# Patient Record
Sex: FEMALE | Race: WHITE | NOT HISPANIC OR LATINO | URBAN - METROPOLITAN AREA
[De-identification: names, ages, dates, MRNs, and addresses within clinical notes are randomized per-mention and may not be internally consistent; named-entity substitution may affect disease eponyms.]

---

## 2017-03-01 ENCOUNTER — OUTPATIENT (OUTPATIENT)
Dept: OUTPATIENT SERVICES | Facility: HOSPITAL | Age: 77
LOS: 1 days | End: 2017-03-01
Payer: COMMERCIAL

## 2017-03-01 ENCOUNTER — APPOINTMENT (OUTPATIENT)
Dept: CT IMAGING | Facility: CLINIC | Age: 77
End: 2017-03-01

## 2017-03-01 DIAGNOSIS — Z90.710 ACQUIRED ABSENCE OF BOTH CERVIX AND UTERUS: Chronic | ICD-10-CM

## 2017-03-01 DIAGNOSIS — H26.9 UNSPECIFIED CATARACT: Chronic | ICD-10-CM

## 2017-03-01 DIAGNOSIS — Z98.89 OTHER SPECIFIED POSTPROCEDURAL STATES: Chronic | ICD-10-CM

## 2017-03-01 DIAGNOSIS — R42 DIZZINESS AND GIDDINESS: ICD-10-CM

## 2017-03-01 PROCEDURE — 70450 CT HEAD/BRAIN W/O DYE: CPT

## 2017-03-01 PROCEDURE — 70450 CT HEAD/BRAIN W/O DYE: CPT | Mod: 26

## 2017-05-28 PROBLEM — M25.569 KNEE PAIN: Status: ACTIVE | Noted: 2017-05-28

## 2017-05-31 ENCOUNTER — APPOINTMENT (OUTPATIENT)
Dept: ORTHOPEDIC SURGERY | Facility: CLINIC | Age: 77
End: 2017-05-31

## 2017-05-31 VITALS — SYSTOLIC BLOOD PRESSURE: 139 MMHG | HEART RATE: 68 BPM | DIASTOLIC BLOOD PRESSURE: 82 MMHG | TEMPERATURE: 98.1 F

## 2017-05-31 DIAGNOSIS — Z87.39 PERSONAL HISTORY OF OTHER DISEASES OF THE MUSCULOSKELETAL SYSTEM AND CONNECTIVE TISSUE: ICD-10-CM

## 2017-05-31 DIAGNOSIS — M25.561 PAIN IN RIGHT KNEE: ICD-10-CM

## 2017-05-31 DIAGNOSIS — Z86.79 PERSONAL HISTORY OF OTHER DISEASES OF THE CIRCULATORY SYSTEM: ICD-10-CM

## 2017-05-31 DIAGNOSIS — M25.562 PAIN IN RIGHT KNEE: ICD-10-CM

## 2017-05-31 DIAGNOSIS — Z87.891 PERSONAL HISTORY OF NICOTINE DEPENDENCE: ICD-10-CM

## 2017-05-31 DIAGNOSIS — M25.569 PAIN IN UNSPECIFIED KNEE: ICD-10-CM

## 2017-05-31 DIAGNOSIS — Z78.9 OTHER SPECIFIED HEALTH STATUS: ICD-10-CM

## 2017-05-31 DIAGNOSIS — Z82.61 FAMILY HISTORY OF ARTHRITIS: ICD-10-CM

## 2017-05-31 RX ORDER — OMEPRAZOLE 10 MG/1
10 CAPSULE, DELAYED RELEASE ORAL
Refills: 0 | Status: ACTIVE | COMMUNITY

## 2017-05-31 RX ORDER — IRBESARTAN AND HYDROCHLOROTHIAZIDE 300; 12.5 MG/1; MG/1
TABLET, FILM COATED ORAL
Refills: 0 | Status: ACTIVE | COMMUNITY

## 2017-09-01 ENCOUNTER — OUTPATIENT (OUTPATIENT)
Dept: OUTPATIENT SERVICES | Facility: HOSPITAL | Age: 77
LOS: 1 days | End: 2017-09-01
Payer: COMMERCIAL

## 2017-09-01 ENCOUNTER — APPOINTMENT (OUTPATIENT)
Dept: MRI IMAGING | Facility: CLINIC | Age: 77
End: 2017-09-01
Payer: COMMERCIAL

## 2017-09-01 DIAGNOSIS — H26.9 UNSPECIFIED CATARACT: Chronic | ICD-10-CM

## 2017-09-01 DIAGNOSIS — Z90.710 ACQUIRED ABSENCE OF BOTH CERVIX AND UTERUS: Chronic | ICD-10-CM

## 2017-09-01 DIAGNOSIS — Z00.8 ENCOUNTER FOR OTHER GENERAL EXAMINATION: ICD-10-CM

## 2017-09-01 DIAGNOSIS — Z98.89 OTHER SPECIFIED POSTPROCEDURAL STATES: Chronic | ICD-10-CM

## 2017-09-01 PROCEDURE — 72148 MRI LUMBAR SPINE W/O DYE: CPT | Mod: 26

## 2017-09-01 PROCEDURE — 72148 MRI LUMBAR SPINE W/O DYE: CPT

## 2017-10-27 ENCOUNTER — EMERGENCY (EMERGENCY)
Facility: HOSPITAL | Age: 77
LOS: 1 days | Discharge: HOME | End: 2017-10-27
Attending: EMERGENCY MEDICINE | Admitting: EMERGENCY MEDICINE
Payer: COMMERCIAL

## 2017-10-27 VITALS
DIASTOLIC BLOOD PRESSURE: 71 MMHG | SYSTOLIC BLOOD PRESSURE: 109 MMHG | RESPIRATION RATE: 17 BRPM | OXYGEN SATURATION: 98 % | TEMPERATURE: 98 F | HEART RATE: 65 BPM

## 2017-10-27 VITALS
TEMPERATURE: 98 F | DIASTOLIC BLOOD PRESSURE: 87 MMHG | WEIGHT: 141.98 LBS | HEART RATE: 93 BPM | OXYGEN SATURATION: 97 % | HEIGHT: 65 IN | SYSTOLIC BLOOD PRESSURE: 153 MMHG | RESPIRATION RATE: 16 BRPM

## 2017-10-27 DIAGNOSIS — Z90.710 ACQUIRED ABSENCE OF BOTH CERVIX AND UTERUS: Chronic | ICD-10-CM

## 2017-10-27 DIAGNOSIS — Z98.89 OTHER SPECIFIED POSTPROCEDURAL STATES: Chronic | ICD-10-CM

## 2017-10-27 DIAGNOSIS — H26.9 UNSPECIFIED CATARACT: Chronic | ICD-10-CM

## 2017-10-27 PROCEDURE — 70160 X-RAY EXAM OF NASAL BONES: CPT | Mod: 26

## 2017-10-27 PROCEDURE — 70160 X-RAY EXAM OF NASAL BONES: CPT

## 2017-10-27 PROCEDURE — 73562 X-RAY EXAM OF KNEE 3: CPT

## 2017-10-27 PROCEDURE — 99284 EMERGENCY DEPT VISIT MOD MDM: CPT

## 2017-10-27 PROCEDURE — 99284 EMERGENCY DEPT VISIT MOD MDM: CPT | Mod: 25

## 2017-10-27 PROCEDURE — 73562 X-RAY EXAM OF KNEE 3: CPT | Mod: 26,50

## 2017-10-27 NOTE — ED PROVIDER NOTE - CARE PLAN
Principal Discharge DX:	Fall, initial encounter  Secondary Diagnosis:	Acute pain of both knees  Secondary Diagnosis:	Contusion of nose, initial encounter

## 2017-10-27 NOTE — ED PROVIDER NOTE - ENMT, MLM
Airway patent, Dried blood in left nare; no active bleeding. Mouth with normal mucosa. Throat has no vesicles, no oropharyngeal exudates and uvula is midline.

## 2017-10-27 NOTE — ED PROVIDER NOTE - FAMILY HISTORY
Mother  Still living? No  Family history of DVT, Age at diagnosis: Age Unknown     Father  Still living? No  Family history of arthritis, Age at diagnosis: Age Unknown

## 2017-10-27 NOTE — ED ADULT TRIAGE NOTE - CHIEF COMPLAINT QUOTE
States tripped and fell while at work and would like to have her knees checked because "they are artifical." Small laceration present to bridge of nose. Denies LOC. Denies syncope. Denies dizziness prior to fall. Denies blood thinners. Ambulatory into ED with steady gait. GCS 15. Alert and oriented x4. Denies neck pain. Denies numbness or tingling to peripheral extremities.

## 2017-10-27 NOTE — ED ADULT NURSE NOTE - OBJECTIVE STATEMENT
pt reports falling at work from standing height, landed on knees and did not brace fall, landed on face, denies LOC. Pt presents with bruised swollen nose with no epistaxis and bruised left knee. Pt reports epistaxis immediately after fall.

## 2017-10-27 NOTE — ED PROVIDER NOTE - OBJECTIVE STATEMENT
This patient is a 77 year old woman who presents to the ER c/o pain at her nose and requesting to be checked out s/p fall.  Patient states that she fell from standing.  No LOC.  She is concerned about her knee because she has hx of knee replacements.  Patient reports she had epistaxis after the fall that quickly resolved.

## 2017-10-27 NOTE — ED ADULT NURSE NOTE - PMH
Degenerative joint disease  Right Knee  GERD (gastroesophageal reflux disease)    Hypertension    Osteoarthritis

## 2017-11-22 ENCOUNTER — TRANSCRIPTION ENCOUNTER (OUTPATIENT)
Age: 77
End: 2017-11-22

## 2017-11-22 ENCOUNTER — EMERGENCY (EMERGENCY)
Facility: HOSPITAL | Age: 77
LOS: 1 days | Discharge: HOME | End: 2017-11-22
Attending: EMERGENCY MEDICINE
Payer: COMMERCIAL

## 2017-11-22 VITALS
SYSTOLIC BLOOD PRESSURE: 150 MMHG | DIASTOLIC BLOOD PRESSURE: 82 MMHG | TEMPERATURE: 98 F | HEART RATE: 87 BPM | RESPIRATION RATE: 18 BRPM | OXYGEN SATURATION: 99 %

## 2017-11-22 VITALS — HEIGHT: 65 IN | WEIGHT: 156.09 LBS

## 2017-11-22 DIAGNOSIS — H26.9 UNSPECIFIED CATARACT: Chronic | ICD-10-CM

## 2017-11-22 DIAGNOSIS — Z90.710 ACQUIRED ABSENCE OF BOTH CERVIX AND UTERUS: Chronic | ICD-10-CM

## 2017-11-22 DIAGNOSIS — Z98.89 OTHER SPECIFIED POSTPROCEDURAL STATES: Chronic | ICD-10-CM

## 2017-11-22 PROCEDURE — 73030 X-RAY EXAM OF SHOULDER: CPT

## 2017-11-22 PROCEDURE — 71101 X-RAY EXAM UNILAT RIBS/CHEST: CPT | Mod: 26

## 2017-11-22 PROCEDURE — 73030 X-RAY EXAM OF SHOULDER: CPT | Mod: 26,RT

## 2017-11-22 PROCEDURE — 90471 IMMUNIZATION ADMIN: CPT

## 2017-11-22 PROCEDURE — 70160 X-RAY EXAM OF NASAL BONES: CPT

## 2017-11-22 PROCEDURE — 71101 X-RAY EXAM UNILAT RIBS/CHEST: CPT

## 2017-11-22 PROCEDURE — 99284 EMERGENCY DEPT VISIT MOD MDM: CPT | Mod: 25

## 2017-11-22 PROCEDURE — 90715 TDAP VACCINE 7 YRS/> IM: CPT

## 2017-11-22 PROCEDURE — 70160 X-RAY EXAM OF NASAL BONES: CPT | Mod: 26

## 2017-11-22 PROCEDURE — 99284 EMERGENCY DEPT VISIT MOD MDM: CPT

## 2017-11-22 RX ORDER — TETANUS TOXOID, REDUCED DIPHTHERIA TOXOID AND ACELLULAR PERTUSSIS VACCINE, ADSORBED 5; 2.5; 8; 8; 2.5 [IU]/.5ML; [IU]/.5ML; UG/.5ML; UG/.5ML; UG/.5ML
0.5 SUSPENSION INTRAMUSCULAR ONCE
Qty: 0 | Refills: 0 | Status: COMPLETED | OUTPATIENT
Start: 2017-11-22 | End: 2017-11-22

## 2017-11-22 RX ORDER — IBUPROFEN 200 MG
1 TABLET ORAL
Qty: 15 | Refills: 0
Start: 2017-11-22 | End: 2017-11-27

## 2017-11-22 RX ADMIN — TETANUS TOXOID, REDUCED DIPHTHERIA TOXOID AND ACELLULAR PERTUSSIS VACCINE, ADSORBED 0.5 MILLILITER(S): 5; 2.5; 8; 8; 2.5 SUSPENSION INTRAMUSCULAR at 20:09

## 2017-11-22 NOTE — ED STATDOCS - ATTENDING CONTRIBUTION TO CARE
I, Audrey Yancey, performed the initial face to face bedside interview with this patient regarding history of present illness, review of symptoms and relevant past medical, social and family history.  I completed an independent physical examination.  I was the initial provider who evaluated this patient. I have signed out the follow up of any pending tests (i.e. labs, radiological studies) to the ACP.  I have communicated the patient’s plan of care and disposition with the ACP.  The history, relevant review of systems, past medical and surgical history, medical decision making, and physical examination was documented by the scribe in my presence and I attest to the accuracy of the documentation.

## 2017-11-22 NOTE — ED ADULT TRIAGE NOTE - CHIEF COMPLAINT QUOTE
s/p fall from standing height, denies blood thinner, denies LOC, abrasion to nose lip and lac above right eye

## 2017-11-22 NOTE — ED STATDOCS - PROGRESS NOTE DETAILS
Plastics called at 20:06 PA NOTE: Pt seen by intake physician and hpi/orders/plan reviewed. PT presenting to ED with complaints of fall forward walking up steps in home, pt denies LOC, head trama, anticoagulin use, HA dizziness, N/V, ABD pain, SOB, DIff breathing, weakens,, numbness, tingling, confusion.   PE: GEN: Awake, alert,  NAD,  EYES: PERRL CARDIAC: Reg rate and rhythm, S1,S2, RRR  RESP: No distress noted. Lungs CTA bilaterally no wheeze, ronchi, rales. ABD: soft,  non-tender, no guarding. . NEURO: AOx3, no focal deficits MS: YANNICK, Strenth intact, TTP DIff Rt shoulder, Rt ribs.    PLAN: add xrays and revaluate PA NOTE: PT seen resting comfortably in no acute distress, no acute complaints at this time, PT tolerating PO intake,  PT informed of all results, pt informed of possibility of change in radiology read after official read, lac closed by dr cat, PT will be DC home with supportive care, follow up to dr cat in 7 days,    educated about when to return to the ED if needed. PT verbalizes that he understands all instructions and results.

## 2017-11-22 NOTE — ED ADULT NURSE NOTE - OBJECTIVE STATEMENT
patient states that she was walking in the kitchen when she fell hitting her right eye and right rib. patient denies any LOC, or being on any blood thinners. patient had laceration repaired by plastic surgery in [facility]

## 2017-11-22 NOTE — ED STATDOCS - LACERATION LENGTH DESCRIPTION FT
3 cm relatively straight laceration through right eyebrow 3 cm s-shaped laceration through right eyebrow

## 2017-11-22 NOTE — ED STATDOCS - OBJECTIVE STATEMENT
77 year old female presenting to the ED complaining of a head injury and right rib pain s/p trip and fall. Pt states that she tripped while walking and fell from standing height. She states that she did not lose consciousness She states that she never has taken Plavix or Xarelto. Pt denies nausea. No further complaints at this time. 77 year old female presenting to the ED complaining of a head injury and right rib pain s/p trip and fall. Pt states that she tripped while walking and fell from standing height. She states that she did not lose consciousness She states that she never has taken Plavix or Xarelto. Pt denies nausea. She states that she is unsure as to when her last tetanus vaccination was given. No further complaints at this time. 77 year old female presenting to the ED complaining of a head injury and right rib pain s/p trip and fall. Pt states that she tripped while walking and fell from standing height. She states that she did not lose consciousness She states that she NEVER has taken Plavix, Xarelto, or any blood thinners. Pt denies nausea. She states that she is unsure as to when her last tetanus vaccination was given. No further complaints at this time.

## 2017-11-22 NOTE — ED STATDOCS - CARE PLAN
Principal Discharge DX:	Nasal bone fracture Principal Discharge DX:	Nasal bone fracture  Secondary Diagnosis:	Laceration of face

## 2017-11-29 ENCOUNTER — APPOINTMENT (OUTPATIENT)
Dept: ORTHOPEDIC SURGERY | Facility: CLINIC | Age: 77
End: 2017-11-29
Payer: COMMERCIAL

## 2017-11-29 VITALS
TEMPERATURE: 98 F | HEART RATE: 100 BPM | BODY MASS INDEX: 26.63 KG/M2 | SYSTOLIC BLOOD PRESSURE: 130 MMHG | DIASTOLIC BLOOD PRESSURE: 77 MMHG | HEIGHT: 64 IN | WEIGHT: 156 LBS

## 2017-11-29 PROCEDURE — 73562 X-RAY EXAM OF KNEE 3: CPT | Mod: 50

## 2017-11-29 PROCEDURE — 99213 OFFICE O/P EST LOW 20 MIN: CPT

## 2017-11-29 RX ORDER — IRBESARTAN AND HYDROCHLOROTHIAZIDE 150; 12.5 MG/1; MG/1
150-12.5 TABLET ORAL
Qty: 30 | Refills: 0 | Status: ACTIVE | COMMUNITY
Start: 2017-11-02

## 2017-11-29 RX ORDER — OMEPRAZOLE 20 MG/1
20 CAPSULE, DELAYED RELEASE ORAL
Qty: 90 | Refills: 0 | Status: ACTIVE | COMMUNITY
Start: 2017-09-07

## 2017-11-29 RX ORDER — METHYLPREDNISOLONE 4 MG/1
4 TABLET ORAL
Qty: 21 | Refills: 0 | Status: ACTIVE | COMMUNITY
Start: 2017-08-24

## 2017-11-29 RX ORDER — SODIUM SULFATE, POTASSIUM SULFATE, MAGNESIUM SULFATE 17.5; 3.13; 1.6 G/ML; G/ML; G/ML
17.5-3.13-1.6 SOLUTION, CONCENTRATE ORAL
Qty: 354 | Refills: 0 | Status: ACTIVE | COMMUNITY
Start: 2017-08-02

## 2017-11-29 RX ORDER — NAPROXEN 500 MG/1
500 TABLET ORAL
Qty: 60 | Refills: 0 | Status: ACTIVE | COMMUNITY
Start: 2017-11-03

## 2017-11-29 RX ORDER — IBUPROFEN 600 MG/1
600 TABLET, FILM COATED ORAL
Qty: 15 | Refills: 0 | Status: ACTIVE | COMMUNITY
Start: 2017-11-22

## 2018-04-04 ENCOUNTER — APPOINTMENT (OUTPATIENT)
Dept: ORTHOPEDIC SURGERY | Facility: CLINIC | Age: 78
End: 2018-04-04
Payer: COMMERCIAL

## 2018-04-04 VITALS
SYSTOLIC BLOOD PRESSURE: 134 MMHG | HEART RATE: 80 BPM | HEIGHT: 64 IN | BODY MASS INDEX: 26.46 KG/M2 | DIASTOLIC BLOOD PRESSURE: 77 MMHG | WEIGHT: 155 LBS | TEMPERATURE: 98.7 F

## 2018-04-04 PROCEDURE — 99213 OFFICE O/P EST LOW 20 MIN: CPT

## 2018-10-01 ENCOUNTER — OTHER (OUTPATIENT)
Age: 78
End: 2018-10-01

## 2019-02-14 NOTE — ED STATDOCS - ATTESTATION, MLM
10 I have reviewed and confirmed nurses' notes for patient's medications, allergies, medical history, and surgical history.

## 2019-03-23 ENCOUNTER — EMERGENCY (EMERGENCY)
Facility: HOSPITAL | Age: 79
LOS: 1 days | Discharge: HOME | End: 2019-03-23
Attending: EMERGENCY MEDICINE
Payer: COMMERCIAL

## 2019-03-23 VITALS
DIASTOLIC BLOOD PRESSURE: 51 MMHG | OXYGEN SATURATION: 99 % | HEIGHT: 65 IN | SYSTOLIC BLOOD PRESSURE: 154 MMHG | TEMPERATURE: 98 F | HEART RATE: 79 BPM | WEIGHT: 134.92 LBS | RESPIRATION RATE: 16 BRPM

## 2019-03-23 DIAGNOSIS — Z90.710 ACQUIRED ABSENCE OF BOTH CERVIX AND UTERUS: Chronic | ICD-10-CM

## 2019-03-23 DIAGNOSIS — H26.9 UNSPECIFIED CATARACT: Chronic | ICD-10-CM

## 2019-03-23 DIAGNOSIS — Z98.89 OTHER SPECIFIED POSTPROCEDURAL STATES: Chronic | ICD-10-CM

## 2019-03-23 PROCEDURE — 99284 EMERGENCY DEPT VISIT MOD MDM: CPT | Mod: 25

## 2019-03-23 PROCEDURE — 70450 CT HEAD/BRAIN W/O DYE: CPT | Mod: 26

## 2019-03-23 PROCEDURE — 90715 TDAP VACCINE 7 YRS/> IM: CPT

## 2019-03-23 PROCEDURE — 12013 RPR F/E/E/N/L/M 2.6-5.0 CM: CPT | Mod: XU

## 2019-03-23 PROCEDURE — 90471 IMMUNIZATION ADMIN: CPT

## 2019-03-23 PROCEDURE — 21310: CPT

## 2019-03-23 PROCEDURE — 12013 RPR F/E/E/N/L/M 2.6-5.0 CM: CPT | Mod: 59

## 2019-03-23 PROCEDURE — 70450 CT HEAD/BRAIN W/O DYE: CPT

## 2019-03-23 PROCEDURE — 70486 CT MAXILLOFACIAL W/O DYE: CPT

## 2019-03-23 PROCEDURE — 70486 CT MAXILLOFACIAL W/O DYE: CPT | Mod: 26

## 2019-03-23 RX ORDER — TETANUS TOXOID, REDUCED DIPHTHERIA TOXOID AND ACELLULAR PERTUSSIS VACCINE, ADSORBED 5; 2.5; 8; 8; 2.5 [IU]/.5ML; [IU]/.5ML; UG/.5ML; UG/.5ML; UG/.5ML
0.5 SUSPENSION INTRAMUSCULAR ONCE
Qty: 0 | Refills: 0 | Status: COMPLETED | OUTPATIENT
Start: 2019-03-23 | End: 2019-03-23

## 2019-03-23 RX ORDER — OXYCODONE HYDROCHLORIDE 5 MG/1
1 TABLET ORAL
Qty: 12 | Refills: 0 | OUTPATIENT
Start: 2019-03-23 | End: 2019-03-25

## 2019-03-23 RX ORDER — ACETAMINOPHEN 500 MG
650 TABLET ORAL ONCE
Qty: 0 | Refills: 0 | Status: COMPLETED | OUTPATIENT
Start: 2019-03-23 | End: 2019-03-23

## 2019-03-23 RX ORDER — OXYCODONE HYDROCHLORIDE 5 MG/1
1 TABLET ORAL
Qty: 12 | Refills: 0
Start: 2019-03-23 | End: 2019-03-25

## 2019-03-23 RX ADMIN — Medication 650 MILLIGRAM(S): at 16:54

## 2019-03-23 RX ADMIN — TETANUS TOXOID, REDUCED DIPHTHERIA TOXOID AND ACELLULAR PERTUSSIS VACCINE, ADSORBED 0.5 MILLILITER(S): 5; 2.5; 8; 8; 2.5 SUSPENSION INTRAMUSCULAR at 17:29

## 2019-03-23 RX ADMIN — Medication 650 MILLIGRAM(S): at 15:07

## 2019-03-23 NOTE — ED ADULT TRIAGE NOTE - CHIEF COMPLAINT QUOTE
patient was walking at a parade and she caught her boot on a raised sidewalk. patient reports falling and hitting her nose, knocking some of her top teeth out, scraped her chin and hurt her left knee. patient is not on anticoags. denies loc.

## 2019-03-23 NOTE — ED STATDOCS - ENMT, MLM
Dried blood in b/l nares, no active bleeding. Abrasion to nose, chin and mouth. 0.5cm laceration inner mucosa of lower lip. Avulsed Throat has no vesicles, no oropharyngeal exudates and uvula is midline.

## 2019-03-23 NOTE — ED STATDOCS - PROGRESS NOTE DETAILS
Results noted and findings d/w patient - + Nasal bone fx/ Orbital wall fx. Pt also with small laceration under chin (2 small punctate lacs) , 1cm linear lac under lower lip and small 0.5 cm lac inner lower lip. Will suture/ Dermabond. Case d/w Dr Oglesby who reviewed CT scans and is recommending pain control, no nose blowing and out-pt f/u. Pt will see in Office on Tuesday.

## 2019-03-23 NOTE — ED STATDOCS - SKIN, MLM
skin normal color for race, warm, dry. Ecchymosis over inner aspect of left knee and over right patella

## 2019-03-23 NOTE — ED STATDOCS - CARE PLAN
Principal Discharge DX:	Facial bone fracture  Secondary Diagnosis:	Orbital fracture  Secondary Diagnosis:	Laceration of face

## 2019-03-23 NOTE — ED STATDOCS - NS_ ATTENDINGSCRIBEDETAILS _ED_A_ED_FT
I, Alejandra Mackenzie, performed the initial face to face bedside interview with this patient regarding history of present illness, review of symptoms and relevant past medical, social and family history.  I completed an independent physical examination.  I was the provider who initially evaluated this patient.  The history, relevant review of systems, past medical and surgical history, medical decision making, and physical examination was documented by the scribe in my presence and I attest to the accuracy of the documentation. Follow-up on ordered tests (ie labs, radiologic studies) and re-evaluation of the patient's status has been communicated to the ACP.  Disposition of the patient will be based on test outcome and response to ED interventions.

## 2019-03-23 NOTE — ED STATDOCS - CLINICAL SUMMARY MEDICAL DECISION MAKING FREE TEXT BOX
78 y.o F presents s/p fall with abrasions to chin and nose, laceration to upper frontal gingiva. Plan for x-ray to r/o facial bone fracture, treat for pain. Ice pack applied to minimize swelling

## 2019-04-05 ENCOUNTER — APPOINTMENT (OUTPATIENT)
Dept: ORTHOPEDIC SURGERY | Facility: CLINIC | Age: 79
End: 2019-04-05
Payer: COMMERCIAL

## 2019-04-05 VITALS
TEMPERATURE: 98.7 F | HEIGHT: 64 IN | BODY MASS INDEX: 28.17 KG/M2 | SYSTOLIC BLOOD PRESSURE: 141 MMHG | DIASTOLIC BLOOD PRESSURE: 73 MMHG | HEART RATE: 72 BPM | WEIGHT: 165 LBS

## 2019-04-05 DIAGNOSIS — Z96.653 AFTERCARE FOLLOWING JOINT REPLACEMENT SURGERY: ICD-10-CM

## 2019-04-05 DIAGNOSIS — Z47.1 AFTERCARE FOLLOWING JOINT REPLACEMENT SURGERY: ICD-10-CM

## 2019-04-05 PROCEDURE — 73562 X-RAY EXAM OF KNEE 3: CPT | Mod: 50

## 2019-04-05 PROCEDURE — 99213 OFFICE O/P EST LOW 20 MIN: CPT

## 2019-04-05 NOTE — PHYSICAL EXAM
[Normal] : Gait: normal [LE] : Sensory: Intact in bilateral lower extremities [DP] : dorsalis pedis 2+ and symmetric bilaterally [PT] : posterior tibial 2+ and symmetric bilaterally [Obese] : obese [Poor Appearance] : well-appearing [Acute Distress] : not in acute distress [de-identified] : General appearance: Well nourished, well developed, pleasant, alert, and oriented x3.\par Respiratory: Breathing not labored, in no acute distress.\par HEENT: Normocephalic. EOM intact. Sclerae are clear.\par CV: No apparent abnormalities. No lower leg edema. No varicosities. Pedal pulses are palpable.\par Neurologic: Sensation is intact to light touch in the upper and lower extremities. No muscle weakness.\par Dermatologic: No apparent skin lesions or rash.\par Spine: C spine and L spine appear normal and move freely, normal and nontender.\par Upper Extremities: Hands, wrists, and elbows are normal and move freely. Shoulders are normal and move freely. All range of motion is symmetrical.\par Normal body habitus. Pulses are palpable.\par Review of systems, please see form for complete details. Medical data sheet was reviewed.\par \par Left knee, FROM hip, midline incision well healed, no effusion, no lag,  0 - 130, no crepitus, no medial pain, no lateral pain, no anterior drawer, no posterior drawer, stable, anatomic alignment \par Right knee, FROM hip, midline incision well healed,  no effusion, 0 - 120, no crepitus, no medial pain, no lateral pain, no anterior drawer, no posterior drawer, stable, anatomic alignment \par   [de-identified] : bilateral Knee xrays, taken at the office today:\par Standing AP, Lateral, and Merchant films show:\par Total knee replacement hardware in neutral alignment, without evidence of loosening, well centered patella, no evidence of fracture

## 2019-04-05 NOTE — ADDENDUM
[FreeTextEntry1] : I, Stephan Moon, acted solely as a scribe for Dr. Mg Mckenzie on 04/05/2019.\par \par All medical record entries made by the scribe were at my, Dr. Mg Mckenzie, direction and personally dictated by me on 04/05/2019. I have reviewed the chart and agree that the record accurately reflects my personal performance of the history, physical exam, assessment and plan. I have also personally directed, reviewed, and agreed with the chart.

## 2019-04-05 NOTE — HISTORY OF PRESENT ILLNESS
[de-identified] : Patient is a 78 year old female who presents for follow up of bilateral TKA. patient fell and landed on left knee \par Left knee - 2014. anterior pain and bruising s/p fall positive clicking, no locking, buckling or swelling \par Right knee 2015, no pain, no clicking, locking, buckling or swelling

## 2019-04-05 NOTE — REASON FOR VISIT
[Follow-Up Visit] : a follow-up visit for [Artificial Knee Joint] : artificial knee joint [Knee Pain] : knee pain [FreeTextEntry2] : Left knee pain

## 2019-06-03 ENCOUNTER — APPOINTMENT (OUTPATIENT)
Dept: ORTHOPEDIC SURGERY | Facility: CLINIC | Age: 79
End: 2019-06-03

## 2020-09-04 ENCOUNTER — OUTPATIENT (OUTPATIENT)
Dept: OUTPATIENT SERVICES | Facility: HOSPITAL | Age: 80
LOS: 1 days | End: 2020-09-04
Payer: MEDICARE

## 2020-09-04 VITALS
RESPIRATION RATE: 16 BRPM | HEIGHT: 65 IN | OXYGEN SATURATION: 96 % | TEMPERATURE: 98 F | DIASTOLIC BLOOD PRESSURE: 60 MMHG | HEART RATE: 60 BPM | WEIGHT: 154.1 LBS | SYSTOLIC BLOOD PRESSURE: 150 MMHG

## 2020-09-04 DIAGNOSIS — Z96.653 PRESENCE OF ARTIFICIAL KNEE JOINT, BILATERAL: Chronic | ICD-10-CM

## 2020-09-04 DIAGNOSIS — Z01.818 ENCOUNTER FOR OTHER PREPROCEDURAL EXAMINATION: ICD-10-CM

## 2020-09-04 DIAGNOSIS — I10 ESSENTIAL (PRIMARY) HYPERTENSION: ICD-10-CM

## 2020-09-04 DIAGNOSIS — Z98.89 OTHER SPECIFIED POSTPROCEDURAL STATES: Chronic | ICD-10-CM

## 2020-09-04 DIAGNOSIS — J01.00 ACUTE MAXILLARY SINUSITIS, UNSPECIFIED: ICD-10-CM

## 2020-09-04 DIAGNOSIS — Z90.710 ACQUIRED ABSENCE OF BOTH CERVIX AND UTERUS: Chronic | ICD-10-CM

## 2020-09-04 DIAGNOSIS — J32.0 CHRONIC MAXILLARY SINUSITIS: ICD-10-CM

## 2020-09-04 DIAGNOSIS — H26.9 UNSPECIFIED CATARACT: Chronic | ICD-10-CM

## 2020-09-04 LAB
ANION GAP SERPL CALC-SCNC: 4 MMOL/L — LOW (ref 5–17)
APTT BLD: 30.2 SEC — SIGNIFICANT CHANGE UP (ref 27.5–35.5)
BUN SERPL-MCNC: 31 MG/DL — HIGH (ref 7–23)
CALCIUM SERPL-MCNC: 8.8 MG/DL — SIGNIFICANT CHANGE UP (ref 8.5–10.1)
CHLORIDE SERPL-SCNC: 110 MMOL/L — HIGH (ref 96–108)
CO2 SERPL-SCNC: 27 MMOL/L — SIGNIFICANT CHANGE UP (ref 22–31)
CREAT SERPL-MCNC: 1.4 MG/DL — HIGH (ref 0.5–1.3)
GLUCOSE SERPL-MCNC: 89 MG/DL — SIGNIFICANT CHANGE UP (ref 70–99)
HCT VFR BLD CALC: 36.2 % — SIGNIFICANT CHANGE UP (ref 34.5–45)
HGB BLD-MCNC: 11.5 G/DL — SIGNIFICANT CHANGE UP (ref 11.5–15.5)
INR BLD: 1.04 RATIO — SIGNIFICANT CHANGE UP (ref 0.88–1.16)
MCHC RBC-ENTMCNC: 27.9 PG — SIGNIFICANT CHANGE UP (ref 27–34)
MCHC RBC-ENTMCNC: 31.8 GM/DL — LOW (ref 32–36)
MCV RBC AUTO: 87.9 FL — SIGNIFICANT CHANGE UP (ref 80–100)
NRBC # BLD: 0 /100 WBCS — SIGNIFICANT CHANGE UP (ref 0–0)
PLATELET # BLD AUTO: 232 K/UL — SIGNIFICANT CHANGE UP (ref 150–400)
POTASSIUM SERPL-MCNC: 4.6 MMOL/L — SIGNIFICANT CHANGE UP (ref 3.5–5.3)
POTASSIUM SERPL-SCNC: 4.6 MMOL/L — SIGNIFICANT CHANGE UP (ref 3.5–5.3)
PROTHROM AB SERPL-ACNC: 12.1 SEC — SIGNIFICANT CHANGE UP (ref 10.6–13.6)
RBC # BLD: 4.12 M/UL — SIGNIFICANT CHANGE UP (ref 3.8–5.2)
RBC # FLD: 13 % — SIGNIFICANT CHANGE UP (ref 10.3–14.5)
SODIUM SERPL-SCNC: 141 MMOL/L — SIGNIFICANT CHANGE UP (ref 135–145)
WBC # BLD: 5.83 K/UL — SIGNIFICANT CHANGE UP (ref 3.8–10.5)
WBC # FLD AUTO: 5.83 K/UL — SIGNIFICANT CHANGE UP (ref 3.8–10.5)

## 2020-09-04 PROCEDURE — G0463: CPT

## 2020-09-04 PROCEDURE — 85610 PROTHROMBIN TIME: CPT

## 2020-09-04 PROCEDURE — 93010 ELECTROCARDIOGRAM REPORT: CPT

## 2020-09-04 PROCEDURE — 36415 COLL VENOUS BLD VENIPUNCTURE: CPT

## 2020-09-04 PROCEDURE — 85730 THROMBOPLASTIN TIME PARTIAL: CPT

## 2020-09-04 PROCEDURE — 93005 ELECTROCARDIOGRAM TRACING: CPT

## 2020-09-04 PROCEDURE — 85027 COMPLETE CBC AUTOMATED: CPT

## 2020-09-04 PROCEDURE — 80048 BASIC METABOLIC PNL TOTAL CA: CPT

## 2020-09-04 NOTE — H&P PST ADULT - NSICDXPROBLEM_GEN_ALL_CORE_FT
PROBLEM DIAGNOSES  Problem: Acute maxillary sinusitis, unspecified  Assessment and Plan: Endoscopic sinus surgery- Right maxillary oroantral fistula closure  Labs- CBC, BMP, PT/PTT, INR  Pre op instructions discussed    Problem: Benign hypertension  Assessment and Plan: continue with meds

## 2020-09-04 NOTE — H&P PST ADULT - NSICDXPASTMEDICALHX_GEN_ALL_CORE_FT
PAST MEDICAL HISTORY:  Degenerative joint disease Right Knee    GERD (gastroesophageal reflux disease)     Hypertension     Hyperthyroidism 2017    Osteoarthritis

## 2020-09-04 NOTE — H&P PST ADULT - NSICDXPASTSURGICALHX_GEN_ALL_CORE_FT
PAST SURGICAL HISTORY:  Cataract bilat    H/O total knee replacement, bilateral Left 2014, Right 2015    History of needle biopsy B/L Breasts    S/P hysterectomy with oophorectomy 1988

## 2020-09-04 NOTE — H&P PST ADULT - NSICDXFAMILYHX_GEN_ALL_CORE_FT
FAMILY HISTORY:  Father  Still living? No  Family history of arthritis, Age at diagnosis: Age Unknown    Mother  Still living? No  Family history of DVT, Age at diagnosis: Age Unknown

## 2020-09-04 NOTE — H&P PST ADULT - HISTORY OF PRESENT ILLNESS
This 80 yo female with h/o HTN, HLD, GERD, OA, hyperthyroidsm recurrent sinusitis, c/o non healing right upper gum since dental extraction since 8/2019. Pt had ENT consult- s/p CT sinuses revealed acute maxillary sinusitis, oroantral fistula- scheduled for endoscopic sinus surgery- right maxillary oroantral fistula closure on 09/16/20 . Pt denies any fever, chills or s/s of infection    **Covid 19 PCR to be scheduled This 80 yo female with h/o HTN, HLD, GERD, OA, hyperthyroidsm, recurrent sinusitis, c/o non healing right upper gum s/p dental extraction since 8/2019. Pt had ENT consult- s/p CT sinuses revealed acute maxillary sinusitis, oroantral fistula- scheduled for endoscopic sinus surgery- right maxillary oroantral fistula closure on 09/16/20 . Pt denies any fever, chills or s/s of infection    **Covid 19 PCR to be scheduled

## 2020-09-04 NOTE — H&P PST ADULT - NSANTHOSAYNRD_GEN_A_CORE
No. BALDO screening performed.  STOP BANG Legend: 0-2 = LOW Risk; 3-4 = INTERMEDIATE Risk; 5-8 = HIGH Risk

## 2020-09-10 PROBLEM — E05.90 THYROTOXICOSIS, UNSPECIFIED WITHOUT THYROTOXIC CRISIS OR STORM: Chronic | Status: ACTIVE | Noted: 2020-09-04

## 2020-09-14 ENCOUNTER — OUTPATIENT (OUTPATIENT)
Dept: OUTPATIENT SERVICES | Facility: HOSPITAL | Age: 80
LOS: 1 days | End: 2020-09-14
Payer: MEDICARE

## 2020-09-14 DIAGNOSIS — Z98.89 OTHER SPECIFIED POSTPROCEDURAL STATES: Chronic | ICD-10-CM

## 2020-09-14 DIAGNOSIS — Z11.59 ENCOUNTER FOR SCREENING FOR OTHER VIRAL DISEASES: ICD-10-CM

## 2020-09-14 DIAGNOSIS — Z96.653 PRESENCE OF ARTIFICIAL KNEE JOINT, BILATERAL: Chronic | ICD-10-CM

## 2020-09-14 DIAGNOSIS — H26.9 UNSPECIFIED CATARACT: Chronic | ICD-10-CM

## 2020-09-14 DIAGNOSIS — Z90.710 ACQUIRED ABSENCE OF BOTH CERVIX AND UTERUS: Chronic | ICD-10-CM

## 2020-09-14 LAB — SARS-COV-2 RNA SPEC QL NAA+PROBE: SIGNIFICANT CHANGE UP

## 2020-09-14 PROCEDURE — U0003: CPT

## 2020-09-15 ENCOUNTER — TRANSCRIPTION ENCOUNTER (OUTPATIENT)
Age: 80
End: 2020-09-15

## 2020-09-15 NOTE — ASU PATIENT PROFILE, ADULT - LEARNING ASSESSMENT (PATIENT) ADDITIONAL COMMENTS
Tips for safer suregery and pre op instructions given.  Pt demonstrates understanding of instructions.

## 2020-09-16 ENCOUNTER — OUTPATIENT (OUTPATIENT)
Dept: OUTPATIENT SERVICES | Facility: HOSPITAL | Age: 80
LOS: 1 days | End: 2020-09-16
Payer: MEDICARE

## 2020-09-16 ENCOUNTER — RESULT REVIEW (OUTPATIENT)
Age: 80
End: 2020-09-16

## 2020-09-16 VITALS
DIASTOLIC BLOOD PRESSURE: 58 MMHG | RESPIRATION RATE: 16 BRPM | SYSTOLIC BLOOD PRESSURE: 132 MMHG | OXYGEN SATURATION: 97 % | TEMPERATURE: 98 F | HEART RATE: 77 BPM | WEIGHT: 158.95 LBS | HEIGHT: 65 IN

## 2020-09-16 VITALS
OXYGEN SATURATION: 99 % | HEART RATE: 68 BPM | SYSTOLIC BLOOD PRESSURE: 138 MMHG | DIASTOLIC BLOOD PRESSURE: 60 MMHG | RESPIRATION RATE: 12 BRPM

## 2020-09-16 DIAGNOSIS — Z96.653 PRESENCE OF ARTIFICIAL KNEE JOINT, BILATERAL: Chronic | ICD-10-CM

## 2020-09-16 DIAGNOSIS — H26.9 UNSPECIFIED CATARACT: Chronic | ICD-10-CM

## 2020-09-16 DIAGNOSIS — J32.0 CHRONIC MAXILLARY SINUSITIS: ICD-10-CM

## 2020-09-16 DIAGNOSIS — Z01.818 ENCOUNTER FOR OTHER PREPROCEDURAL EXAMINATION: ICD-10-CM

## 2020-09-16 DIAGNOSIS — J01.00 ACUTE MAXILLARY SINUSITIS, UNSPECIFIED: ICD-10-CM

## 2020-09-16 DIAGNOSIS — J32.9 CHRONIC SINUSITIS, UNSPECIFIED: ICD-10-CM

## 2020-09-16 DIAGNOSIS — Z98.89 OTHER SPECIFIED POSTPROCEDURAL STATES: Chronic | ICD-10-CM

## 2020-09-16 DIAGNOSIS — Z90.710 ACQUIRED ABSENCE OF BOTH CERVIX AND UTERUS: Chronic | ICD-10-CM

## 2020-09-16 PROCEDURE — 31267 ENDOSCOPY MAXILLARY SINUS: CPT | Mod: RT

## 2020-09-16 PROCEDURE — 88304 TISSUE EXAM BY PATHOLOGIST: CPT | Mod: 26

## 2020-09-16 PROCEDURE — 88311 DECALCIFY TISSUE: CPT | Mod: 26

## 2020-09-16 PROCEDURE — 88311 DECALCIFY TISSUE: CPT

## 2020-09-16 PROCEDURE — 30580 REPAIR UPPER JAW FISTULA: CPT

## 2020-09-16 PROCEDURE — 88305 TISSUE EXAM BY PATHOLOGIST: CPT | Mod: 26

## 2020-09-16 PROCEDURE — 88305 TISSUE EXAM BY PATHOLOGIST: CPT

## 2020-09-16 PROCEDURE — 88304 TISSUE EXAM BY PATHOLOGIST: CPT

## 2020-09-16 RX ORDER — OXYCODONE HYDROCHLORIDE 5 MG/1
5 TABLET ORAL ONCE
Refills: 0 | Status: DISCONTINUED | OUTPATIENT
Start: 2020-09-16 | End: 2020-09-16

## 2020-09-16 RX ORDER — CEPHALEXIN 500 MG
1 CAPSULE ORAL
Qty: 20 | Refills: 0
Start: 2020-09-16 | End: 2020-09-25

## 2020-09-16 RX ORDER — ONDANSETRON 8 MG/1
4 TABLET, FILM COATED ORAL ONCE
Refills: 0 | Status: DISCONTINUED | OUTPATIENT
Start: 2020-09-16 | End: 2020-09-16

## 2020-09-16 RX ORDER — HYDROMORPHONE HYDROCHLORIDE 2 MG/ML
0.5 INJECTION INTRAMUSCULAR; INTRAVENOUS; SUBCUTANEOUS
Refills: 0 | Status: DISCONTINUED | OUTPATIENT
Start: 2020-09-16 | End: 2020-09-16

## 2020-09-16 RX ORDER — SODIUM CHLORIDE 9 MG/ML
1000 INJECTION, SOLUTION INTRAVENOUS
Refills: 0 | Status: DISCONTINUED | OUTPATIENT
Start: 2020-09-16 | End: 2020-09-16

## 2020-09-16 RX ADMIN — SODIUM CHLORIDE 75 MILLILITER(S): 9 INJECTION, SOLUTION INTRAVENOUS at 09:41

## 2020-09-16 NOTE — BRIEF OPERATIVE NOTE - NSICDXBRIEFPREOP_GEN_ALL_CORE_FT
PRE-OP DIAGNOSIS:  Chronic maxillary sinusitis 16-Sep-2020 09:31:05  Ruperto Gonzalez  Maxillary sinusitis, chronic 16-Sep-2020 09:30:47  Ruperto Gonzalez

## 2020-09-16 NOTE — BRIEF OPERATIVE NOTE - NSICDXBRIEFPROCEDURE_GEN_ALL_CORE_FT
PROCEDURES:  Functional endoscopic sinus surgery (FESS) with antrostomy of maxillary sinus 16-Sep-2020 09:33:05  Ruperto Gonzalez

## 2020-09-16 NOTE — ASU DISCHARGE PLAN (ADULT/PEDIATRIC) - CARE PROVIDER_API CALL
Ruperto Gonzalez  OTOLARYNGOLOGY  [address] [place] [id number]  Phone: [phone]  Fax: [phone]  Follow Up Time:

## 2020-09-17 LAB — SURGICAL PATHOLOGY STUDY: SIGNIFICANT CHANGE UP

## 2021-01-01 ENCOUNTER — APPOINTMENT (OUTPATIENT)
Dept: ORTHOPEDIC SURGERY | Facility: CLINIC | Age: 81
End: 2021-01-01
Payer: MEDICARE

## 2021-01-01 VITALS
WEIGHT: 164 LBS | SYSTOLIC BLOOD PRESSURE: 193 MMHG | HEART RATE: 66 BPM | HEIGHT: 65 IN | TEMPERATURE: 98.1 F | DIASTOLIC BLOOD PRESSURE: 76 MMHG | BODY MASS INDEX: 27.32 KG/M2

## 2021-01-01 PROCEDURE — 99215 OFFICE O/P EST HI 40 MIN: CPT

## 2021-01-01 PROCEDURE — 73562 X-RAY EXAM OF KNEE 3: CPT | Mod: 50

## 2021-01-01 RX ORDER — MELOXICAM 15 MG/1
15 TABLET ORAL
Qty: 30 | Refills: 0 | Status: ACTIVE | COMMUNITY
Start: 2021-01-01 | End: 1900-01-01

## 2021-03-26 NOTE — ED ADULT TRIAGE NOTE - LOCATION:
Patient Education        Asthma Attack in Children: Care Instructions  Overview     During an asthma attack, the airways swell and narrow. This makes it hard for your child to breathe. Severe asthma attacks can be dangerous. But you can help prevent these attacks by keeping your child's asthma under control and treating symptoms before they get bad. Symptoms include being short of breath, having chest tightness, coughing, and wheezing. Noting and treating these symptoms can also help you avoid trips to the emergency room. If you notice that your child has any problems or new symptoms, get medical treatment right away. Follow-up care is a key part of your child's treatment and safety. Be sure to make and go to all appointments, and call your doctor if your child is having problems. It's also a good idea to know your child's test results and keep a list of the medicines your child takes. How can you care for your child at home? Follow an action plan  · Make and follow an asthma action plan. It lists the medicines your child takes every day and will show you what to do if your child has an attack. · Work with a doctor to make a plan if your child doesn't have one. Make treatment part of daily life. · Tell teachers and coaches that your child has asthma. Give them a copy of your child's asthma action plan. Take medications correctly  · Your child should take asthma medicines as directed. Talk to your child's doctor right away if you have any questions about how your child should take them. Most children with asthma need two types of medicine. ? Your child may take daily controller medicine to control asthma. This is usually an inhaled steroid. Don't use the daily medicine to treat an attack that has already started. It doesn't work fast enough. ? Your child will use a quick-relief medicine when he or she has symptoms of an attack. This is usually an albuterol inhaler.   ? Make sure that your child has quick-relief medicine with him or her at all times. ? If your doctor prescribed steroid pills for your child to use during an attack, give them exactly as prescribed. It may take hours for the pills to work. But they may make the episode shorter and help your child breathe better. Check your child's breathing  · If your child has a peak flow meter, use it to check how well your child is breathing. This can help you predict when an asthma attack is going to occur. Then your child can take medicine to prevent the asthma attack or make it less severe. Most children age 11 and older can learn how to use this meter. Avoid asthma triggers  · Keep your child away from smoke. Do not smoke or let anyone else smoke around your child or in your house. · Try to learn what triggers your child's asthma attacks. Then avoid the triggers when you can. Common triggers include colds, smoke, air pollution, pollen, mold, pets, cockroaches, stress, and cold air. · Make sure your child is up to date on immunizations and gets a yearly flu vaccine. When should you call for help? Call 911 anytime you think your child may need emergency care. For example, call if:    · Your child has severe trouble breathing. Call your doctor now or seek immediate medical care if:    · Your child's symptoms do not get better after you've followed the asthma action plan.     · Your child has new or worse trouble breathing.     · Your child's coughing or wheezing gets worse.     · Your child coughs up dark brown or bloody mucus (sputum).     · Your child has a new or higher fever.    Watch closely for changes in your child's health, and be sure to contact your doctor if:    · Your child needs quick-relief medicine on more than 2 days a week within a month (unless it is just for exercise).     · Your child coughs more deeply or more often, especially if you notice more mucus or a change in the color of the mucus.     · Your child is not getting better as expected. Where can you learn more? Go to [email or web address].[email or web address] org and sign in to your [employer] account. Enter B822 in the [employer] box to learn more about \"Asthma Attack in Children: Care Instructions. \"     If you do not have an account, please click on the \"Sign Up Now\" link. Current as of: October 26, 2020               Content Version: 12.8  © 2006-2021 Healthwise[employer] Incorporated. Care instructions adapted under license by [facility] ([facility]). If you have questions about a medical condition or this instruction, always ask your healthcare professional. [address] any warranty or liability for your use of this information. Right arm;

## 2021-07-05 ENCOUNTER — TRANSCRIPTION ENCOUNTER (OUTPATIENT)
Age: 81
End: 2021-07-05

## 2021-07-06 ENCOUNTER — EMERGENCY (EMERGENCY)
Facility: HOSPITAL | Age: 81
LOS: 1 days | Discharge: HOME | End: 2021-07-06
Attending: EMERGENCY MEDICINE
Payer: MEDICARE

## 2021-07-06 VITALS
WEIGHT: 143.3 LBS | TEMPERATURE: 98 F | HEART RATE: 90 BPM | OXYGEN SATURATION: 99 % | RESPIRATION RATE: 18 BRPM | SYSTOLIC BLOOD PRESSURE: 154 MMHG | DIASTOLIC BLOOD PRESSURE: 72 MMHG | HEIGHT: 65 IN

## 2021-07-06 DIAGNOSIS — H26.9 UNSPECIFIED CATARACT: Chronic | ICD-10-CM

## 2021-07-06 DIAGNOSIS — Z96.653 PRESENCE OF ARTIFICIAL KNEE JOINT, BILATERAL: Chronic | ICD-10-CM

## 2021-07-06 DIAGNOSIS — Z90.710 ACQUIRED ABSENCE OF BOTH CERVIX AND UTERUS: Chronic | ICD-10-CM

## 2021-07-06 DIAGNOSIS — Z98.89 OTHER SPECIFIED POSTPROCEDURAL STATES: Chronic | ICD-10-CM

## 2021-07-06 LAB
ALBUMIN SERPL ELPH-MCNC: 4.1 G/DL — SIGNIFICANT CHANGE UP (ref 3.3–5.2)
ALP SERPL-CCNC: 123 U/L — HIGH (ref 40–120)
ALT FLD-CCNC: 18 U/L — SIGNIFICANT CHANGE UP
ANION GAP SERPL CALC-SCNC: 12 MMOL/L — SIGNIFICANT CHANGE UP (ref 5–17)
AST SERPL-CCNC: 23 U/L — SIGNIFICANT CHANGE UP
BILIRUB SERPL-MCNC: 0.2 MG/DL — LOW (ref 0.4–2)
BUN SERPL-MCNC: 30 MG/DL — HIGH (ref 8–20)
CALCIUM SERPL-MCNC: 9.5 MG/DL — SIGNIFICANT CHANGE UP (ref 8.6–10.2)
CHLORIDE SERPL-SCNC: 106 MMOL/L — SIGNIFICANT CHANGE UP (ref 98–107)
CO2 SERPL-SCNC: 23 MMOL/L — SIGNIFICANT CHANGE UP (ref 22–29)
CREAT SERPL-MCNC: 1.36 MG/DL — HIGH (ref 0.5–1.3)
GLUCOSE SERPL-MCNC: 139 MG/DL — HIGH (ref 70–99)
HCT VFR BLD CALC: 35.9 % — SIGNIFICANT CHANGE UP (ref 34.5–45)
HGB BLD-MCNC: 11.4 G/DL — LOW (ref 11.5–15.5)
MCHC RBC-ENTMCNC: 27.8 PG — SIGNIFICANT CHANGE UP (ref 27–34)
MCHC RBC-ENTMCNC: 31.8 GM/DL — LOW (ref 32–36)
MCV RBC AUTO: 87.6 FL — SIGNIFICANT CHANGE UP (ref 80–100)
PLATELET # BLD AUTO: 215 K/UL — SIGNIFICANT CHANGE UP (ref 150–400)
POTASSIUM SERPL-MCNC: 4.9 MMOL/L — SIGNIFICANT CHANGE UP (ref 3.5–5.3)
POTASSIUM SERPL-SCNC: 4.9 MMOL/L — SIGNIFICANT CHANGE UP (ref 3.5–5.3)
PROT SERPL-MCNC: 7 G/DL — SIGNIFICANT CHANGE UP (ref 6.6–8.7)
RBC # BLD: 4.1 M/UL — SIGNIFICANT CHANGE UP (ref 3.8–5.2)
RBC # FLD: 13.2 % — SIGNIFICANT CHANGE UP (ref 10.3–14.5)
SODIUM SERPL-SCNC: 141 MMOL/L — SIGNIFICANT CHANGE UP (ref 135–145)
WBC # BLD: 10.85 K/UL — HIGH (ref 3.8–10.5)
WBC # FLD AUTO: 10.85 K/UL — HIGH (ref 3.8–10.5)

## 2021-07-06 PROCEDURE — 93010 ELECTROCARDIOGRAM REPORT: CPT

## 2021-07-06 PROCEDURE — G1004: CPT

## 2021-07-06 PROCEDURE — 70450 CT HEAD/BRAIN W/O DYE: CPT | Mod: 26,MH

## 2021-07-06 PROCEDURE — 73030 X-RAY EXAM OF SHOULDER: CPT | Mod: 26,LT

## 2021-07-06 PROCEDURE — 99284 EMERGENCY DEPT VISIT MOD MDM: CPT

## 2021-07-06 PROCEDURE — 70486 CT MAXILLOFACIAL W/O DYE: CPT | Mod: 26,MG

## 2021-07-06 PROCEDURE — 72125 CT NECK SPINE W/O DYE: CPT | Mod: 26,MH

## 2021-07-06 RX ORDER — ACETAMINOPHEN 500 MG
650 TABLET ORAL ONCE
Refills: 0 | Status: COMPLETED | OUTPATIENT
Start: 2021-07-06 | End: 2021-07-06

## 2021-07-06 RX ORDER — ONDANSETRON 8 MG/1
4 TABLET, FILM COATED ORAL ONCE
Refills: 0 | Status: COMPLETED | OUTPATIENT
Start: 2021-07-06 | End: 2021-07-06

## 2021-07-06 RX ADMIN — Medication 650 MILLIGRAM(S): at 21:01

## 2021-07-06 RX ADMIN — ONDANSETRON 4 MILLIGRAM(S): 8 TABLET, FILM COATED ORAL at 21:43

## 2021-07-06 NOTE — ED PROVIDER NOTE - PROGRESS NOTE DETAILS
Jose Raul AMANDA: sutured and nose packed by Dr Gorman, Recommending augmentin, will return in the morning to remove packing. Will place patient in CDU.

## 2021-07-06 NOTE — ED PROVIDER NOTE - CLINICAL SUMMARY MEDICAL DECISION MAKING FREE TEXT BOX
Pt s/p fall, possible LOC. plan for EKG, labs, CT head, neck, max face, x-ray shoulder and lac repair

## 2021-07-06 NOTE — ED PROVIDER NOTE - PMH
Degenerative joint disease  Right Knee  GERD (gastroesophageal reflux disease)    Hypertension    Hyperthyroidism  2017  Osteoarthritis

## 2021-07-06 NOTE — ED PROVIDER NOTE - PHYSICAL EXAMINATION
Gen: Well appearing in NAD  Head: Large laceration left forehead, well approximated. T Shaped laceration above bridge of nose. TTP bridge of nose   Neck: trachea midline  EENT: Dried blood in both nares, no active bleeding. Dried blood in mouth, no active bleeding  Eyes: EOMI  Resp:  No distress  Ext: no deformities  MSK: No hip tenderness. No L/C/S spine tenderness. TTP Left shoulder, full ROM.   Neuro:  A&O appears non focal  Skin:  Warm and dry as visualized  Psych:  Normal affect and mood

## 2021-07-06 NOTE — ED PROVIDER NOTE - OBJECTIVE STATEMENT
81y/o F with PMHx of HTN, Acid Reflux, Thyroid disease presents to the ED s/p fall, witnessed by  at bedside with head trauma c/o HA, nose and shoulder pain.  denies LOC but patient states she does not recall what happened. Denies CP, Nausea, vomiting, SOB or vision changes. No use of blood thinners. Last TDAP 1 year ago.

## 2021-07-06 NOTE — ED PROVIDER NOTE - PSH
Cataract  bilat  H/O total knee replacement, bilateral  Left 2014, Right 2015  History of needle biopsy  B/L Breasts  S/P hysterectomy with oophorectomy  1988

## 2021-07-06 NOTE — ED ADULT NURSE NOTE - OBJECTIVE STATEMENT
PT A7Ox4.  Pt stated that she was at home and she was walking in her kitchen when she tripped and fell.  PT denies LOC however does not remember if she hit anything.  PT received in c-collar, noted to have a bloody nose and lacerations to forehead.  Bleeding controlled. Will continue to monitor.

## 2021-07-06 NOTE — ED ADULT TRIAGE NOTE - CHIEF COMPLAINT QUOTE
BIBEMS s/p unwitnessed fall from standing height with unknown LOC. Lacerations noted to head and c-collar applied for EMS. GCS 15 in triage. (-) anticoagulation medication. Pt denies blurry vision, double vision, dizziness, lightheadedness. Pts only complaint is mild HA at this time.

## 2021-07-06 NOTE — ED PROVIDER NOTE - CARE PLAN
Principal Discharge DX:	Facial laceration, initial encounter  Secondary Diagnosis:	Nasal bone fracture  Secondary Diagnosis:	Epistaxis

## 2021-07-07 VITALS
SYSTOLIC BLOOD PRESSURE: 122 MMHG | DIASTOLIC BLOOD PRESSURE: 66 MMHG | HEART RATE: 74 BPM | OXYGEN SATURATION: 99 % | TEMPERATURE: 98 F | RESPIRATION RATE: 18 BRPM

## 2021-07-07 PROCEDURE — 70486 CT MAXILLOFACIAL W/O DYE: CPT

## 2021-07-07 PROCEDURE — 21315 CLSD TX NSL FX MNPJ WO STBLJ: CPT

## 2021-07-07 PROCEDURE — 99285 EMERGENCY DEPT VISIT HI MDM: CPT | Mod: 25

## 2021-07-07 PROCEDURE — 70450 CT HEAD/BRAIN W/O DYE: CPT

## 2021-07-07 PROCEDURE — 14041 TIS TRNFR F/C/C/M/N/A/G/H/F: CPT

## 2021-07-07 PROCEDURE — 85027 COMPLETE CBC AUTOMATED: CPT

## 2021-07-07 PROCEDURE — 73030 X-RAY EXAM OF SHOULDER: CPT

## 2021-07-07 PROCEDURE — 93005 ELECTROCARDIOGRAM TRACING: CPT

## 2021-07-07 PROCEDURE — 80053 COMPREHEN METABOLIC PANEL: CPT

## 2021-07-07 PROCEDURE — 72125 CT NECK SPINE W/O DYE: CPT

## 2021-07-07 PROCEDURE — 12052 INTMD RPR FACE/MM 2.6-5.0 CM: CPT | Mod: XU

## 2021-07-07 PROCEDURE — G0378: CPT

## 2021-07-07 PROCEDURE — 13121 CMPLX RPR S/A/L 2.6-7.5 CM: CPT | Mod: XU

## 2021-07-07 PROCEDURE — 36415 COLL VENOUS BLD VENIPUNCTURE: CPT

## 2021-07-07 PROCEDURE — 13122 CMPLX RPR S/A/L ADDL 5 CM/>: CPT | Mod: XU

## 2021-07-07 PROCEDURE — 96374 THER/PROPH/DIAG INJ IV PUSH: CPT | Mod: XU

## 2021-07-07 PROCEDURE — 99220: CPT

## 2021-07-07 RX ORDER — METOPROLOL TARTRATE 50 MG
25 TABLET ORAL DAILY
Refills: 0 | Status: DISCONTINUED | OUTPATIENT
Start: 2021-07-07 | End: 2021-07-11

## 2021-07-07 RX ORDER — LOSARTAN POTASSIUM 100 MG/1
50 TABLET, FILM COATED ORAL DAILY
Refills: 0 | Status: DISCONTINUED | OUTPATIENT
Start: 2021-07-07 | End: 2021-07-11

## 2021-07-07 RX ORDER — HYDROCHLOROTHIAZIDE 25 MG
12.5 TABLET ORAL DAILY
Refills: 0 | Status: DISCONTINUED | OUTPATIENT
Start: 2021-07-07 | End: 2021-07-11

## 2021-07-07 RX ADMIN — LOSARTAN POTASSIUM 50 MILLIGRAM(S): 100 TABLET, FILM COATED ORAL at 06:28

## 2021-07-07 RX ADMIN — Medication 25 MILLIGRAM(S): at 01:48

## 2021-07-07 RX ADMIN — Medication 12.5 MILLIGRAM(S): at 01:48

## 2021-07-07 RX ADMIN — Medication 1 TABLET(S): at 01:48

## 2021-07-07 NOTE — ED CDU PROVIDER DISPOSITION NOTE - CARE PROVIDER_API CALL
Freddie Gorman)  Plastic Surgery  [address] [place] [address]  Phone: [phone]  Fax: [phone]  Follow Up Time:     Jose Carlos Rivera)  Otolaryngology  [address] [place] [address]  Phone: [phone]  Fax: [phone]  Follow Up Time:

## 2021-07-07 NOTE — CONSULT NOTE ADULT - CONSULT REASON
Dr. Cornelius, please advise on the mother's concerns.   
From: Jenelle Cervantes  To: Shyanne Arreola DO  Sent: 7/15/2019 3:06 PM CDT  Subject: Non-Urgent Medical Question    This message is being sent by Johanna Blunt on behalf of Jenelle Cornelius,    Ronni, Jenelle, and I are going camping this weekend and I had noticed that it'll be 95° on Friday, 90° on Saturday and 86° on Sunday. We will be camping in a tent and Ronni was worried that it will be too hot for her to go because prolonged exposure to heat is bad for babies. Is that true and should I keep her home? Or are there things I can do to make it safe for her to go? Thank you!    Johanna Blunt  
responded  
forehead face lacerations and nasal fracture

## 2021-07-07 NOTE — ED CDU PROVIDER INITIAL DAY NOTE - ATTENDING CONTRIBUTION TO CARE
Jose Raul: I performed a face to face bedside interview with patient regarding history of present illness, review of symptoms and past medical history. I completed an independent physical exam.  I have discussed patient's plan of care with advanced care provider.   I agree with note as stated above including HISTORY OF PRESENT ILLNESS, HIV, PAST MEDICAL/SURGICAL/FAMILY/SOCIAL HISTORY, ALLERGIES AND HOME MEDICATIONS, REVIEW OF SYSTEMS, PHYSICAL EXAM, MEDICAL DECISION MAKING and any PROGRESS NOTES during the time I functioned as the attending physician for this patient  unless otherwise noted. My brief assessment is as follows: Patient s/p fall with scalp lac, nasal bone fx and mild epistaxis. Repaired by plastics, placed in CDU for abx and for packing rmoval in the AM.

## 2021-07-07 NOTE — ED CDU PROVIDER INITIAL DAY NOTE - OBJECTIVE STATEMENT
79y/o F with PMHx of HTN, Acid Reflux, Thyroid disease presents to the ED s/p fall, witnessed by  at bedside with head trauma c/o HA, nose and shoulder pain.  denies LOC but patient states she does not recall what happened. Denies CP, Nausea, vomiting, SOB or vision changes. No use of blood thinners. Last TDAP 1 year ago.

## 2021-07-07 NOTE — ED CDU PROVIDER DISPOSITION NOTE - ATTENDING CONTRIBUTION TO CARE
Jose Raul: I performed a face to face bedside interview with patient regarding history of present illness, review of symptoms and past medical history. I completed an independent physical exam.  I have discussed patient's plan of care with advanced care provider.   I agree with note as stated above including HISTORY OF PRESENT ILLNESS, HIV, PAST MEDICAL/SURGICAL/FAMILY/SOCIAL HISTORY, ALLERGIES AND HOME MEDICATIONS, REVIEW OF SYSTEMS, PHYSICAL EXAM, MEDICAL DECISION MAKING and any PROGRESS NOTES during the time I functioned as the attending physician for this patient  unless otherwise noted. My brief assessment is as follows: Patient with scalp laceration and nasal fracture. Repaired by Dr Gorman, placed in CDU with nasal packing. Nasal packing removed by Dr Gorman in the morning. Ready for dc on abx.

## 2021-07-07 NOTE — ED CDU PROVIDER DISPOSITION NOTE - CLINICAL COURSE
pt is a 81 y/o female presenting to the ed after fall, pt had ct preformed - nasal fxs noted, dr cat plastics sutured laceration nasal packing placed - in obervation dr cat removed packing antibitocs follow up referral with dr cat/ent

## 2021-07-07 NOTE — ED CDU PROVIDER DISPOSITION NOTE - PATIENT PORTAL LINK FT
You can access the FollowMyHealth Patient Portal offered by [facility] by registering at the following website: [email or web address]. By joining [facility]’s FollowMyHealth portal, you will also be able to view your health information using other applications (apps) compatible with our system.

## 2021-09-15 DIAGNOSIS — Z01.818 ENCOUNTER FOR OTHER PREPROCEDURAL EXAMINATION: ICD-10-CM

## 2021-09-17 ENCOUNTER — APPOINTMENT (OUTPATIENT)
Dept: DISASTER EMERGENCY | Facility: CLINIC | Age: 81
End: 2021-09-17

## 2021-09-18 LAB — SARS-COV-2 N GENE NPH QL NAA+PROBE: NOT DETECTED

## 2021-09-20 ENCOUNTER — TRANSCRIPTION ENCOUNTER (OUTPATIENT)
Age: 81
End: 2021-09-20

## 2021-09-20 ENCOUNTER — OUTPATIENT (OUTPATIENT)
Dept: OUTPATIENT SERVICES | Facility: HOSPITAL | Age: 81
LOS: 1 days | End: 2021-09-20
Payer: MEDICARE

## 2021-09-20 VITALS
TEMPERATURE: 98 F | OXYGEN SATURATION: 100 % | HEART RATE: 66 BPM | DIASTOLIC BLOOD PRESSURE: 66 MMHG | RESPIRATION RATE: 18 BRPM | SYSTOLIC BLOOD PRESSURE: 152 MMHG

## 2021-09-20 VITALS — SYSTOLIC BLOOD PRESSURE: 152 MMHG | OXYGEN SATURATION: 99 % | DIASTOLIC BLOOD PRESSURE: 66 MMHG | HEART RATE: 66 BPM

## 2021-09-20 DIAGNOSIS — H26.9 UNSPECIFIED CATARACT: Chronic | ICD-10-CM

## 2021-09-20 DIAGNOSIS — I34.0 NONRHEUMATIC MITRAL (VALVE) INSUFFICIENCY: ICD-10-CM

## 2021-09-20 DIAGNOSIS — Z90.710 ACQUIRED ABSENCE OF BOTH CERVIX AND UTERUS: Chronic | ICD-10-CM

## 2021-09-20 DIAGNOSIS — Z96.653 PRESENCE OF ARTIFICIAL KNEE JOINT, BILATERAL: Chronic | ICD-10-CM

## 2021-09-20 DIAGNOSIS — Z98.89 OTHER SPECIFIED POSTPROCEDURAL STATES: Chronic | ICD-10-CM

## 2021-09-20 LAB
ANION GAP SERPL CALC-SCNC: 13 MMOL/L — SIGNIFICANT CHANGE UP (ref 5–17)
BUN SERPL-MCNC: 26.3 MG/DL — HIGH (ref 8–20)
CALCIUM SERPL-MCNC: 10.1 MG/DL — SIGNIFICANT CHANGE UP (ref 8.6–10.2)
CHLORIDE SERPL-SCNC: 102 MMOL/L — SIGNIFICANT CHANGE UP (ref 98–107)
CO2 SERPL-SCNC: 24 MMOL/L — SIGNIFICANT CHANGE UP (ref 22–29)
CREAT SERPL-MCNC: 1.61 MG/DL — HIGH (ref 0.5–1.3)
GLUCOSE SERPL-MCNC: 99 MG/DL — SIGNIFICANT CHANGE UP (ref 70–99)
HCT VFR BLD CALC: 36.7 % — SIGNIFICANT CHANGE UP (ref 34.5–45)
HGB BLD-MCNC: 11.8 G/DL — SIGNIFICANT CHANGE UP (ref 11.5–15.5)
MAGNESIUM SERPL-MCNC: 1.7 MG/DL — SIGNIFICANT CHANGE UP (ref 1.6–2.6)
MCHC RBC-ENTMCNC: 28 PG — SIGNIFICANT CHANGE UP (ref 27–34)
MCHC RBC-ENTMCNC: 32.2 GM/DL — SIGNIFICANT CHANGE UP (ref 32–36)
MCV RBC AUTO: 87 FL — SIGNIFICANT CHANGE UP (ref 80–100)
PLATELET # BLD AUTO: 253 K/UL — SIGNIFICANT CHANGE UP (ref 150–400)
POTASSIUM SERPL-MCNC: 4.5 MMOL/L — SIGNIFICANT CHANGE UP (ref 3.5–5.3)
POTASSIUM SERPL-SCNC: 4.5 MMOL/L — SIGNIFICANT CHANGE UP (ref 3.5–5.3)
RBC # BLD: 4.22 M/UL — SIGNIFICANT CHANGE UP (ref 3.8–5.2)
RBC # FLD: 13 % — SIGNIFICANT CHANGE UP (ref 10.3–14.5)
SODIUM SERPL-SCNC: 139 MMOL/L — SIGNIFICANT CHANGE UP (ref 135–145)
WBC # BLD: 5.38 K/UL — SIGNIFICANT CHANGE UP (ref 3.8–10.5)
WBC # FLD AUTO: 5.38 K/UL — SIGNIFICANT CHANGE UP (ref 3.8–10.5)

## 2021-09-20 PROCEDURE — 80048 BASIC METABOLIC PNL TOTAL CA: CPT

## 2021-09-20 PROCEDURE — 93005 ELECTROCARDIOGRAM TRACING: CPT

## 2021-09-20 PROCEDURE — 93325 DOPPLER ECHO COLOR FLOW MAPG: CPT

## 2021-09-20 PROCEDURE — 85027 COMPLETE CBC AUTOMATED: CPT

## 2021-09-20 PROCEDURE — 93312 ECHO TRANSESOPHAGEAL: CPT

## 2021-09-20 PROCEDURE — 83735 ASSAY OF MAGNESIUM: CPT

## 2021-09-20 PROCEDURE — 93010 ELECTROCARDIOGRAM REPORT: CPT

## 2021-09-20 PROCEDURE — 93320 DOPPLER ECHO COMPLETE: CPT

## 2021-09-20 PROCEDURE — 36415 COLL VENOUS BLD VENIPUNCTURE: CPT

## 2021-09-20 RX ORDER — CALCIUM CARBONATE 500(1250)
1 TABLET ORAL
Qty: 0 | Refills: 0 | DISCHARGE

## 2021-09-20 RX ORDER — IRBESARTAN AND HYDROCHLOROTHIAZIDE 12.5; 3 MG/1; MG/1
1 TABLET ORAL
Qty: 0 | Refills: 0 | DISCHARGE

## 2021-09-20 RX ORDER — IRBESARTAN 75 MG/1
1 TABLET ORAL
Qty: 30 | Refills: 0
Start: 2021-09-20 | End: 2021-10-19

## 2021-09-20 RX ORDER — ACETAMINOPHEN 500 MG
2 TABLET ORAL
Qty: 0 | Refills: 0 | DISCHARGE

## 2021-09-20 RX ORDER — METOPROLOL TARTRATE 50 MG
1 TABLET ORAL
Qty: 0 | Refills: 0 | DISCHARGE

## 2021-09-20 NOTE — DISCHARGE NOTE PROVIDER - NSDCCPTREATMENT_GEN_ALL_CORE_FT
PRINCIPAL PROCEDURE  Procedure: Transesophageal echocardiogram (JOE)  Findings and Treatment: Do not drive or operate machinery today as you have received sedation. You should take it easy today and take your time, especially when changing positions. Follow up with  *** to further discuss the results of the OJE and any further evaluations going forward.       PRINCIPAL PROCEDURE  Procedure: Transesophageal echocardiogram (JOE)  Findings and Treatment: Do not drive or operate machinery today as you have received sedation. You should take it easy today and take your time, especially when changing positions. Follow up with Dr. Larose to further discuss the results of the JOE and any further evaluations going forward.

## 2021-09-20 NOTE — DISCHARGE NOTE NURSING/CASE MANAGEMENT/SOCIAL WORK - PATIENT PORTAL LINK FT
You can access the FollowMyHealth Patient Portal offered by [facility] by registering at the following website: [email or web address]. By joining [employer]’s FollowMyHealth portal, you will also be able to view your health information using other applications (apps) compatible with our system.

## 2021-09-20 NOTE — PROGRESS NOTE ADULT - SUBJECTIVE AND OBJECTIVE BOX
[facility], [facility]                                   [address] [place] [address]                                                      PHONE: [phone]                                                         FAX: [phone]  -----------------------------------------------------------------------------------------------------------    I have seen and examined this patient. H & P reviewed. Informed consent obtained.   Risks and benefits fully explained. All questions answered.

## 2021-09-20 NOTE — DISCHARGE NOTE NURSING/CASE MANAGEMENT/SOCIAL WORK - NSDCVIVACCINE_GEN_ALL_CORE_FT
Tdap; 22-Nov-2017 20:09; Adriana Shine (RN); [employer]; [id number]; IntraMuscular; Deltoid Right.; 0.5 milliLiter(s); VIS (VIS Published: 09-May-2013, VIS Presented: 22-Nov-2017);   Tdap; 23-Mar-2019 17:29; Shayla Cooper); [employer]; [id number] (Exp. Date: 26-Feb-2021); IntraMuscular; Deltoid Left.; 0.5 milliLiter(s); VIS (VIS Published: 09-May-2013, VIS Presented: 23-Mar-2019);

## 2021-09-20 NOTE — H&P PST ADULT - HISTORY OF PRESENT ILLNESS
80 year old female who initially p/w syncope and collapse.  Work up with echo revealed severe MR.  For JOE to assess mitral valve    8/9/21 TTE:  EF 70-75%  Trace AVR  Severe MVR  Mod TR

## 2021-09-20 NOTE — DISCHARGE NOTE PROVIDER - NSDCFUADDINST_GEN_ALL_CORE_FT
Do not drive or operate machinery today as you have received sedation. You should take it easy today and take your time, especially when changing positions. Follow up with  *** to further discuss the results of the JOE and any further evaluations going forward.

## 2021-09-20 NOTE — H&P PST ADULT - NSICDXPASTMEDICALHX_GEN_ALL_CORE_FT
PAST MEDICAL HISTORY:  Degenerative joint disease Right Knee    GERD (gastroesophageal reflux disease)     Hypertension     Hyperthyroidism 2017    Osteoarthritis     Severe mitral valve regurgitation

## 2021-09-20 NOTE — DISCHARGE NOTE PROVIDER - CARE PROVIDER_API CALL
RACHEL WHEELER  Cardiology  [address] [place] [address].  [place] [address]  Phone: [phone]  Fax: [phone]  Follow Up Time:

## 2021-09-20 NOTE — PROGRESS NOTE ADULT - SUBJECTIVE AND OBJECTIVE BOX
[facility]                                           [facility], [facility]                                   [address] [address]                                                      PHONE: [phone]                                                         FAX: [phone]  --------------------------------------------------------------------------------------------------------    JOE performed    LVEF 60%, moderate MR    Full report to follow    Rec:  Medical therapy for now  Results discussed with pt in detail  Outpt FU with Dr. Larose in 2 weeks                                                     [facility]                                           [facility], [facility]                                   [address] [address]                                                      PHONE: [phone]                                                         FAX: [phone]  --------------------------------------------------------------------------------------------------------    JOE performed    LVEF 60%, moderate MR    Full report to follow    Rec:  Medical therapy for now  Hold HCTZ and change to Avapro only for 2 weeks  Results discussed with pt in detail  Outpt FU with Dr. Larose in 2 weeks

## 2021-09-20 NOTE — DISCHARGE NOTE PROVIDER - HOSPITAL COURSE
80 year old female with h/o HTN, hyperthyroid, OA and DJD, recent syncopal episode thought initially to be related to dehydration, TTE revealed severe MR and presented today for JOE.    Now s/p JOE without gargle, tolerated procedure well, transferred to recovery HDS and in NAD, verbal report from Dr. Huber with mod MR and preserved LVEF. Discussed elevated Cr with Dr. Huber and will D/C HCTZ.    D/C home with plan to F/U with primary cardiologist Dr. Connor. Dr Huber will discuss findings and plan with Dr. Larose.

## 2021-09-20 NOTE — DISCHARGE NOTE PROVIDER - NSDCMRMEDTOKEN_GEN_ALL_CORE_FT
irbesartan 150 mg oral tablet: 1 tab(s) orally once a day   methIMAzole: 2.5  mg orally  metoprolol succinate 25 mg oral tablet, extended release: 1 tab(s) orally once a day  omeprazole 20 mg oral delayed release capsule: 1 cap(s) orally once a day

## 2021-09-20 NOTE — DISCHARGE NOTE PROVIDER - NSDCCPCAREPLAN_GEN_ALL_CORE_FT
PRINCIPAL DISCHARGE DIAGNOSIS  Diagnosis: MR (mitral regurgitation)  Assessment and Plan of Treatment: The JOE revealed moderate MR. At this time, this is managed with medications and close follow up.  Take your medications as prescribed. Monitor for symptoms of heart failure: increasing shortness of breath, inability to lie flat, swelling in your legs, increased abdomiinal girth or bloating, any significant increase in weight, increasing dizziness or fatigue,      SECONDARY DISCHARGE DIAGNOSES  Diagnosis: HTN (hypertension)  Assessment and Plan of Treatment: Continue to take antihypertensive medications as prescribed. Obtain a home blood pressure monitor (at any pharmacy or medical supply store) and monitor blood pressure trends. Call your doctor if you note you blood pressure to be running much higher or lower than usual. Limit salt intake.

## 2021-09-24 NOTE — ASU PATIENT PROFILE, ADULT - PROVIDER NOTIFICATION
Sidney comes in after 3 weeks of trying his binaural Phonak hearing aids.  Overall he has been doing quite well with the hearing aids.  His wife notices that he is hearing much better.  He has the television volume turned from the 40s down to the upper teens which is much more comfortable for his wife.  He does have some complaints about high-frequency sounds such as water running and potato chip bags.  He is counseled that this is normal and he does admit that it seems better now than it did when he 1st started using the hearing aids 3 weeks ago.  We will leave the settings programmed as is for now.  His other complaint is that road noise in driving in the car seems to be intolerable a loud.  I did reprogrammed increasing the noise block in his speech and speech in noise programs to hopefully alleviate that, however again he was counseled that is normal to hear the car noise but hopefully it will not interfere with communication in the car.  He will try the hearing aids at the settings and return in 3 weeks at the end of his trial.  His other question was that he had received some information from his insurance that they may be starting a hearing aid benefit in the beginning of 2022 so he will check into this while he still has the opportunity to return these hearing aids if needed.   Declines

## 2021-12-01 PROBLEM — I34.0 NONRHEUMATIC MITRAL (VALVE) INSUFFICIENCY: Chronic | Status: ACTIVE | Noted: 2021-09-20

## 2021-12-08 NOTE — CONSULT NOTE ADULT - SUBJECTIVE AND OBJECTIVE BOX
pt sp fall does not remember how or what she hit  she has forehead avulsion, nasal laceration nasal  fracture and  epistaxis    pt repaired at bedside  packing placed with oxymetazoline (Afrin)    ice packs to face and nose  packing to be removed in 5 hrs  elevation  antibiotics   None

## 2021-12-09 NOTE — REASON FOR VISIT
[Initial Visit] : an initial visit for [Artificial Knee Joint] : artificial knee joint [FreeTextEntry2] : Left knee pain

## 2021-12-09 NOTE — HISTORY OF PRESENT ILLNESS
[de-identified] : 81-year-old female history of bilateral total knee replacements presents today for evaluation of left greater than right knee pain has been going on for a few months.  Patient states that she fell approximately 6 months ago and had bruising around the knees x-ray showed no abnormalities.  Then she states that after she was walking long distances recently she started have increasing pain and swelling in the left knee.  States the swelling is now resolved however she does get intermittent pains in the knee especially when going up and down stairs and walking long distances.  Denies any fevers chills erythema or discharge.  Denies any other constitutional symptoms.  Denies any neurovascular compromise in lower extremity.  Denies any feelings of instability.

## 2021-12-09 NOTE — PHYSICAL EXAM
[de-identified] : GENERAL APPEARANCE: Well nourished and hydrated, pleasant, alert, and oriented x 3. Appears their stated age. \par HEENT: Normocephalic, extraocular eye motion intact. Nasal septum midline. Oral cavity clear. External auditory canal clear. \par RESPIRATORY: Breath sounds clear and audible in all lobes. No wheezing, No accessory muscle use.\par CARDIOVASCULAR: No apparent abnormalities. No lower leg edema. No varicosities. Pedal pulses are palpable.\par NEUROLOGIC: Sensation is normal, no muscle weakness in the upper or lower extremities.\par DERMATOLOGIC: No apparent skin lesions, moist, warm, no rash.\par SPINE: Cervical spine appears normal and moves freely; thoracic spine appears normal and moves freely; lumbosacral spine appears normal and moves freely, normal, nontender.\par MUSCULOSKELETAL: Hands, wrists, and elbows are normal and move freely, shoulders are normal and move freely. \par Psychiatric: Oriented to person, place, and time, insight and judgement were intact and the affect was normal. \par Musculoskeletal:. Left knee exam shows mild effusion, ROM is 0-1 30 degrees, medial joint line tenderness.  There is tenderness palpation over the pes insertion.  There is excessive anterior drawer of 5 to 7 mm with some mild laxity in flexion.\par 5/5 motor strength in bilateral lower extremities. Sensory: Intact in bilateral lower extremities. DTRs: Biceps, brachioradialis, triceps, patellar, ankle and plantar 2+ and symmetric bilaterally. Pulses: dorsalis pedis, posterior tibial, femoral, popliteal, and radial 2+ and symmetric bilaterally. \par Constitutional: Alert and in no acute distress, but well-appearing. \par Musculoskeletal:. Right knee exam shows no effusion, ROM is 0-1 30 degrees, no instability, no joint line tenderness. \par 5/5 motor strength in bilateral lower extremities. Sensory: Intact in bilateral lower extremities. DTRs: Biceps, brachioradialis, triceps, patellar, ankle and plantar 2+ and symmetric bilaterally. Pulses: dorsalis pedis, posterior tibial, femoral, popliteal, and radial 2+ and symmetric bilaterally. \par Constitutional: Alert and in no acute distress, but well-appearing. \par  [de-identified] : 3 views of the bilateral knees obtained in the office today show no acute fracture or dislocation.  Bilateral knee total knee arthroplasty in appropriate alignment without any evidence of loosening or other hardware complication.

## 2021-12-09 NOTE — DISCUSSION/SUMMARY
[Medication Risks Reviewed] : Medication risks reviewed [Surgical risks reviewed] : Surgical risks reviewed [de-identified] : Patient is an 81-year-old female with left greater than right knee pain after a fall approximately 6 months ago.  She does have some flexion instability in her left knee and I do believe that is what is causing her pain and recurrent effusions.  We did discuss that this may need surgical fixation in the future however she would like to try conservative treatment and I think that is warranted.  I recommended physical therapy for strengthening and range of motion of her left knee.  Of also recommended meloxicam 15 mg daily as needed for pain.  I will see her back in 3 months for repeat examination.  All questions were asked and answered.

## 2022-01-01 ENCOUNTER — APPOINTMENT (OUTPATIENT)
Dept: ORTHOPEDIC SURGERY | Facility: CLINIC | Age: 82
End: 2022-01-01
Payer: MEDICARE

## 2022-01-01 ENCOUNTER — INPATIENT (INPATIENT)
Facility: HOSPITAL | Age: 82
LOS: 0 days | DRG: 23 | End: 2022-11-24
Attending: SPECIALIST | Admitting: SPECIALIST
Payer: MEDICARE

## 2022-01-01 ENCOUNTER — APPOINTMENT (OUTPATIENT)
Dept: NEUROLOGY | Facility: HOSPITAL | Age: 82
End: 2022-01-01

## 2022-01-01 VITALS
OXYGEN SATURATION: 100 % | RESPIRATION RATE: 12 BRPM | DIASTOLIC BLOOD PRESSURE: 48 MMHG | SYSTOLIC BLOOD PRESSURE: 132 MMHG | HEART RATE: 70 BPM

## 2022-01-01 VITALS
SYSTOLIC BLOOD PRESSURE: 139 MMHG | DIASTOLIC BLOOD PRESSURE: 75 MMHG | HEART RATE: 73 BPM | WEIGHT: 164 LBS | HEIGHT: 65 IN | BODY MASS INDEX: 27.32 KG/M2

## 2022-01-01 VITALS — SYSTOLIC BLOOD PRESSURE: 152 MMHG | DIASTOLIC BLOOD PRESSURE: 82 MMHG | HEART RATE: 68 BPM

## 2022-01-01 DIAGNOSIS — M70.50 OTHER BURSITIS OF KNEE, UNSPECIFIED KNEE: ICD-10-CM

## 2022-01-01 DIAGNOSIS — I69.30 UNSPECIFIED SEQUELAE OF CEREBRAL INFARCTION: ICD-10-CM

## 2022-01-01 DIAGNOSIS — H26.9 UNSPECIFIED CATARACT: Chronic | ICD-10-CM

## 2022-01-01 DIAGNOSIS — Z96.653 PRESENCE OF ARTIFICIAL KNEE JOINT, BILATERAL: Chronic | ICD-10-CM

## 2022-01-01 DIAGNOSIS — M25.559 PAIN IN UNSPECIFIED HIP: ICD-10-CM

## 2022-01-01 DIAGNOSIS — Z98.89 OTHER SPECIFIED POSTPROCEDURAL STATES: Chronic | ICD-10-CM

## 2022-01-01 DIAGNOSIS — Z96.659 PAIN DUE TO INTERNAL ORTHOPEDIC PROSTHETIC DEVICES, IMPLANTS AND GRAFTS, INITIAL ENCOUNTER: ICD-10-CM

## 2022-01-01 DIAGNOSIS — T84.84XA PAIN DUE TO INTERNAL ORTHOPEDIC PROSTHETIC DEVICES, IMPLANTS AND GRAFTS, INITIAL ENCOUNTER: ICD-10-CM

## 2022-01-01 DIAGNOSIS — Z90.710 ACQUIRED ABSENCE OF BOTH CERVIX AND UTERUS: Chronic | ICD-10-CM

## 2022-01-01 LAB
ABO RH CONFIRMATION: SIGNIFICANT CHANGE UP
ALBUMIN SERPL ELPH-MCNC: 3 G/DL — LOW (ref 3.3–5.2)
ALBUMIN SERPL ELPH-MCNC: 4 G/DL — SIGNIFICANT CHANGE UP (ref 3.3–5.2)
ALP SERPL-CCNC: 101 U/L — SIGNIFICANT CHANGE UP (ref 40–120)
ALP SERPL-CCNC: 98 U/L — SIGNIFICANT CHANGE UP (ref 40–120)
ALT FLD-CCNC: 23 U/L — SIGNIFICANT CHANGE UP
ALT FLD-CCNC: 56 U/L — HIGH
ANION GAP SERPL CALC-SCNC: 12 MMOL/L — SIGNIFICANT CHANGE UP (ref 5–17)
ANION GAP SERPL CALC-SCNC: 14 MMOL/L — SIGNIFICANT CHANGE UP (ref 5–17)
ANISOCYTOSIS BLD QL: SLIGHT — SIGNIFICANT CHANGE UP
APPEARANCE UR: CLEAR — SIGNIFICANT CHANGE UP
APTT BLD: 32.2 SEC — SIGNIFICANT CHANGE UP (ref 27.5–35.5)
AST SERPL-CCNC: 34 U/L — HIGH
AST SERPL-CCNC: 91 U/L — HIGH
BACTERIA # UR AUTO: ABNORMAL
BASE EXCESS BLDA CALC-SCNC: -6.5 MMOL/L — LOW (ref -2–3)
BASE EXCESS BLDA CALC-SCNC: -7.7 MMOL/L — LOW (ref -2–3)
BASOPHILS # BLD AUTO: 0 K/UL — SIGNIFICANT CHANGE UP (ref 0–0.2)
BASOPHILS # BLD AUTO: 0.06 K/UL — SIGNIFICANT CHANGE UP (ref 0–0.2)
BASOPHILS NFR BLD AUTO: 0 % — SIGNIFICANT CHANGE UP (ref 0–2)
BASOPHILS NFR BLD AUTO: 0.8 % — SIGNIFICANT CHANGE UP (ref 0–2)
BILIRUB SERPL-MCNC: 0.3 MG/DL — LOW (ref 0.4–2)
BILIRUB SERPL-MCNC: 0.3 MG/DL — LOW (ref 0.4–2)
BILIRUB UR-MCNC: NEGATIVE — SIGNIFICANT CHANGE UP
BLD GP AB SCN SERPL QL: SIGNIFICANT CHANGE UP
BLOOD GAS COMMENTS ARTERIAL: SIGNIFICANT CHANGE UP
BLOOD GAS COMMENTS ARTERIAL: SIGNIFICANT CHANGE UP
BUN SERPL-MCNC: 31.3 MG/DL — HIGH (ref 8–20)
BUN SERPL-MCNC: 35.7 MG/DL — HIGH (ref 8–20)
BURR CELLS BLD QL SMEAR: PRESENT — SIGNIFICANT CHANGE UP
CALCIUM SERPL-MCNC: 7.2 MG/DL — LOW (ref 8.4–10.5)
CALCIUM SERPL-MCNC: 9 MG/DL — SIGNIFICANT CHANGE UP (ref 8.4–10.5)
CHLORIDE SERPL-SCNC: 109 MMOL/L — HIGH (ref 96–108)
CHLORIDE SERPL-SCNC: 113 MMOL/L — HIGH (ref 96–108)
CK SERPL-CCNC: 72 U/L — SIGNIFICANT CHANGE UP (ref 25–170)
CO2 SERPL-SCNC: 17 MMOL/L — LOW (ref 22–29)
CO2 SERPL-SCNC: 19 MMOL/L — LOW (ref 22–29)
COLOR SPEC: YELLOW — SIGNIFICANT CHANGE UP
CREAT SERPL-MCNC: 1.54 MG/DL — HIGH (ref 0.5–1.3)
CREAT SERPL-MCNC: 1.56 MG/DL — HIGH (ref 0.5–1.3)
DIFF PNL FLD: ABNORMAL
EGFR: 33 ML/MIN/1.73M2 — LOW
EGFR: 34 ML/MIN/1.73M2 — LOW
ELLIPTOCYTES BLD QL SMEAR: SLIGHT — SIGNIFICANT CHANGE UP
EOSINOPHIL # BLD AUTO: 0.31 K/UL — SIGNIFICANT CHANGE UP (ref 0–0.5)
EOSINOPHIL # BLD AUTO: 0.43 K/UL — SIGNIFICANT CHANGE UP (ref 0–0.5)
EOSINOPHIL NFR BLD AUTO: 2.6 % — SIGNIFICANT CHANGE UP (ref 0–6)
EOSINOPHIL NFR BLD AUTO: 5.8 % — SIGNIFICANT CHANGE UP (ref 0–6)
EPI CELLS # UR: SIGNIFICANT CHANGE UP
GLUCOSE SERPL-MCNC: 208 MG/DL — HIGH (ref 70–99)
GLUCOSE SERPL-MCNC: 88 MG/DL — SIGNIFICANT CHANGE UP (ref 70–99)
GLUCOSE UR QL: NEGATIVE MG/DL — SIGNIFICANT CHANGE UP
HCO3 BLDA-SCNC: 20 MMOL/L — LOW (ref 21–28)
HCO3 BLDA-SCNC: 21 MMOL/L — SIGNIFICANT CHANGE UP (ref 21–28)
HCT VFR BLD CALC: 31.7 % — LOW (ref 34.5–45)
HCT VFR BLD CALC: 37.3 % — SIGNIFICANT CHANGE UP (ref 34.5–45)
HGB BLD-MCNC: 11.8 G/DL — SIGNIFICANT CHANGE UP (ref 11.5–15.5)
HGB BLD-MCNC: 9.9 G/DL — LOW (ref 11.5–15.5)
HOROWITZ INDEX BLDA+IHG-RTO: 100 — SIGNIFICANT CHANGE UP
IMM GRANULOCYTES NFR BLD AUTO: 0.3 % — SIGNIFICANT CHANGE UP (ref 0–0.9)
INR BLD: 1.06 RATIO — SIGNIFICANT CHANGE UP (ref 0.88–1.16)
KETONES UR-MCNC: ABNORMAL
LEUKOCYTE ESTERASE UR-ACNC: NEGATIVE — SIGNIFICANT CHANGE UP
LYMPHOCYTES # BLD AUTO: 1.99 K/UL — SIGNIFICANT CHANGE UP (ref 1–3.3)
LYMPHOCYTES # BLD AUTO: 16.5 % — SIGNIFICANT CHANGE UP (ref 13–44)
LYMPHOCYTES # BLD AUTO: 2.67 K/UL — SIGNIFICANT CHANGE UP (ref 1–3.3)
LYMPHOCYTES # BLD AUTO: 36 % — SIGNIFICANT CHANGE UP (ref 13–44)
MACROCYTES BLD QL: SLIGHT — SIGNIFICANT CHANGE UP
MAGNESIUM SERPL-MCNC: 1.8 MG/DL — SIGNIFICANT CHANGE UP (ref 1.6–2.6)
MANUAL SMEAR VERIFICATION: SIGNIFICANT CHANGE UP
MCHC RBC-ENTMCNC: 27.5 PG — SIGNIFICANT CHANGE UP (ref 27–34)
MCHC RBC-ENTMCNC: 28.1 PG — SIGNIFICANT CHANGE UP (ref 27–34)
MCHC RBC-ENTMCNC: 31.2 GM/DL — LOW (ref 32–36)
MCHC RBC-ENTMCNC: 31.6 GM/DL — LOW (ref 32–36)
MCV RBC AUTO: 86.9 FL — SIGNIFICANT CHANGE UP (ref 80–100)
MCV RBC AUTO: 90.1 FL — SIGNIFICANT CHANGE UP (ref 80–100)
MICROCYTES BLD QL: SLIGHT — SIGNIFICANT CHANGE UP
MONOCYTES # BLD AUTO: 0.63 K/UL — SIGNIFICANT CHANGE UP (ref 0–0.9)
MONOCYTES # BLD AUTO: 0.75 K/UL — SIGNIFICANT CHANGE UP (ref 0–0.9)
MONOCYTES NFR BLD AUTO: 10.1 % — SIGNIFICANT CHANGE UP (ref 2–14)
MONOCYTES NFR BLD AUTO: 5.2 % — SIGNIFICANT CHANGE UP (ref 2–14)
NEUTROPHILS # BLD AUTO: 3.49 K/UL — SIGNIFICANT CHANGE UP (ref 1.8–7.4)
NEUTROPHILS # BLD AUTO: 8.92 K/UL — HIGH (ref 1.8–7.4)
NEUTROPHILS NFR BLD AUTO: 47 % — SIGNIFICANT CHANGE UP (ref 43–77)
NEUTROPHILS NFR BLD AUTO: 72.2 % — SIGNIFICANT CHANGE UP (ref 43–77)
NEUTS BAND # BLD: 1.8 % — SIGNIFICANT CHANGE UP (ref 0–8)
NITRITE UR-MCNC: NEGATIVE — SIGNIFICANT CHANGE UP
OVALOCYTES BLD QL SMEAR: SLIGHT — SIGNIFICANT CHANGE UP
PCO2 BLDA: 38 MMHG — SIGNIFICANT CHANGE UP (ref 32–45)
PCO2 BLDA: 61 MMHG — HIGH (ref 32–45)
PH BLDA: 7.15 — CRITICAL LOW (ref 7.35–7.45)
PH BLDA: 7.32 — LOW (ref 7.35–7.45)
PH UR: 5 — SIGNIFICANT CHANGE UP (ref 5–8)
PHOSPHATE SERPL-MCNC: 5.5 MG/DL — HIGH (ref 2.4–4.7)
PLAT MORPH BLD: NORMAL — SIGNIFICANT CHANGE UP
PLATELET # BLD AUTO: 184 K/UL — SIGNIFICANT CHANGE UP (ref 150–400)
PLATELET # BLD AUTO: 192 K/UL — SIGNIFICANT CHANGE UP (ref 150–400)
PO2 BLDA: 398 MMHG — HIGH (ref 83–108)
PO2 BLDA: >496 MMHG — HIGH (ref 83–108)
POIKILOCYTOSIS BLD QL AUTO: SIGNIFICANT CHANGE UP
POLYCHROMASIA BLD QL SMEAR: SLIGHT — SIGNIFICANT CHANGE UP
POTASSIUM SERPL-MCNC: 4.2 MMOL/L — SIGNIFICANT CHANGE UP (ref 3.5–5.3)
POTASSIUM SERPL-MCNC: 4.6 MMOL/L — SIGNIFICANT CHANGE UP (ref 3.5–5.3)
POTASSIUM SERPL-SCNC: 4.2 MMOL/L — SIGNIFICANT CHANGE UP (ref 3.5–5.3)
POTASSIUM SERPL-SCNC: 4.6 MMOL/L — SIGNIFICANT CHANGE UP (ref 3.5–5.3)
PROT SERPL-MCNC: 5.7 G/DL — LOW (ref 6.6–8.7)
PROT SERPL-MCNC: 7 G/DL — SIGNIFICANT CHANGE UP (ref 6.6–8.7)
PROT UR-MCNC: 30 MG/DL
PROTHROM AB SERPL-ACNC: 12.3 SEC — SIGNIFICANT CHANGE UP (ref 10.5–13.4)
RAPID RVP RESULT: SIGNIFICANT CHANGE UP
RBC # BLD: 3.52 M/UL — LOW (ref 3.8–5.2)
RBC # BLD: 4.29 M/UL — SIGNIFICANT CHANGE UP (ref 3.8–5.2)
RBC # FLD: 15.5 % — HIGH (ref 10.3–14.5)
RBC # FLD: 15.8 % — HIGH (ref 10.3–14.5)
RBC BLD AUTO: ABNORMAL
RBC CASTS # UR COMP ASSIST: SIGNIFICANT CHANGE UP /HPF (ref 0–4)
SAO2 % BLDA: 100 % — HIGH (ref 94–98)
SAO2 % BLDA: 99.9 % — HIGH (ref 94–98)
SARS-COV-2 RNA SPEC QL NAA+PROBE: SIGNIFICANT CHANGE UP
SARS-COV-2 RNA SPEC QL NAA+PROBE: SIGNIFICANT CHANGE UP
SODIUM SERPL-SCNC: 142 MMOL/L — SIGNIFICANT CHANGE UP (ref 135–145)
SODIUM SERPL-SCNC: 142 MMOL/L — SIGNIFICANT CHANGE UP (ref 135–145)
SP GR SPEC: 1.01 — SIGNIFICANT CHANGE UP (ref 1.01–1.02)
TROPONIN T SERPL-MCNC: <0.01 NG/ML — SIGNIFICANT CHANGE UP (ref 0–0.06)
TROPONIN T SERPL-MCNC: <0.01 NG/ML — SIGNIFICANT CHANGE UP (ref 0–0.06)
UROBILINOGEN FLD QL: NEGATIVE MG/DL — SIGNIFICANT CHANGE UP
VARIANT LYMPHS # BLD: 1.7 % — SIGNIFICANT CHANGE UP (ref 0–6)
WBC # BLD: 12.06 K/UL — HIGH (ref 3.8–10.5)
WBC # BLD: 7.42 K/UL — SIGNIFICANT CHANGE UP (ref 3.8–10.5)
WBC # FLD AUTO: 12.06 K/UL — HIGH (ref 3.8–10.5)
WBC # FLD AUTO: 7.42 K/UL — SIGNIFICANT CHANGE UP (ref 3.8–10.5)
WBC UR QL: SIGNIFICANT CHANGE UP /HPF (ref 0–5)

## 2022-01-01 PROCEDURE — 99214 OFFICE O/P EST MOD 30 MIN: CPT

## 2022-01-01 PROCEDURE — 61645 PERQ ART M-THROMBECT &/NFS: CPT | Mod: LT

## 2022-01-01 PROCEDURE — 99291 CRITICAL CARE FIRST HOUR: CPT

## 2022-01-01 PROCEDURE — 99233 SBSQ HOSP IP/OBS HIGH 50: CPT

## 2022-01-01 PROCEDURE — 70450 CT HEAD/BRAIN W/O DYE: CPT | Mod: 26

## 2022-01-01 PROCEDURE — 73502 X-RAY EXAM HIP UNI 2-3 VIEWS: CPT | Mod: RT

## 2022-01-01 PROCEDURE — 31500 INSERT EMERGENCY AIRWAY: CPT

## 2022-01-01 PROCEDURE — 73552 X-RAY EXAM OF FEMUR 2/>: CPT | Mod: RT

## 2022-01-01 PROCEDURE — 93010 ELECTROCARDIOGRAM REPORT: CPT

## 2022-01-01 PROCEDURE — 99285 EMERGENCY DEPT VISIT HI MDM: CPT | Mod: 25

## 2022-01-01 PROCEDURE — 70460 CT HEAD/BRAIN W/DYE: CPT | Mod: 26

## 2022-01-01 PROCEDURE — 71045 X-RAY EXAM CHEST 1 VIEW: CPT | Mod: 26

## 2022-01-01 RX ORDER — ETOMIDATE 2 MG/ML
20 INJECTION INTRAVENOUS ONCE
Refills: 0 | Status: COMPLETED | OUTPATIENT
Start: 2022-01-01 | End: 2022-01-01

## 2022-01-01 RX ORDER — SODIUM CHLORIDE 9 MG/ML
1000 INJECTION INTRAMUSCULAR; INTRAVENOUS; SUBCUTANEOUS ONCE
Refills: 0 | Status: COMPLETED | OUTPATIENT
Start: 2022-01-01 | End: 2022-01-01

## 2022-01-01 RX ORDER — ACETAMINOPHEN 500 MG
1000 TABLET ORAL ONCE
Refills: 0 | Status: COMPLETED | OUTPATIENT
Start: 2022-01-01 | End: 2022-01-01

## 2022-01-01 RX ORDER — HYDRALAZINE HCL 50 MG
10 TABLET ORAL
Refills: 0 | Status: DISCONTINUED | OUTPATIENT
Start: 2022-01-01 | End: 2022-01-01

## 2022-01-01 RX ORDER — PHENYLEPHRINE HYDROCHLORIDE 10 MG/ML
0.4 INJECTION INTRAVENOUS
Qty: 160 | Refills: 0 | Status: DISCONTINUED | OUTPATIENT
Start: 2022-01-01 | End: 2022-01-01

## 2022-01-01 RX ORDER — SODIUM CHLORIDE 9 MG/ML
30 INJECTION INTRAMUSCULAR; INTRAVENOUS; SUBCUTANEOUS ONCE
Refills: 0 | Status: COMPLETED | OUTPATIENT
Start: 2022-01-01 | End: 2022-01-01

## 2022-01-01 RX ORDER — CHLORHEXIDINE GLUCONATE 213 G/1000ML
1 SOLUTION TOPICAL
Refills: 0 | Status: DISCONTINUED | OUTPATIENT
Start: 2022-01-01 | End: 2022-01-01

## 2022-01-01 RX ORDER — METOPROLOL TARTRATE 50 MG
5 TABLET ORAL ONCE
Refills: 0 | Status: COMPLETED | OUTPATIENT
Start: 2022-01-01 | End: 2022-01-01

## 2022-01-01 RX ORDER — SODIUM CHLORIDE 9 MG/ML
50 INJECTION INTRAMUSCULAR; INTRAVENOUS; SUBCUTANEOUS
Refills: 0 | Status: DISCONTINUED | OUTPATIENT
Start: 2022-01-01 | End: 2022-01-01

## 2022-01-01 RX ORDER — LABETALOL HCL 100 MG
10 TABLET ORAL
Refills: 0 | Status: DISCONTINUED | OUTPATIENT
Start: 2022-01-01 | End: 2022-01-01

## 2022-01-01 RX ORDER — VASOPRESSIN 20 [USP'U]/ML
0.02 INJECTION INTRAVENOUS
Qty: 40 | Refills: 0 | Status: DISCONTINUED | OUTPATIENT
Start: 2022-01-01 | End: 2022-01-01

## 2022-01-01 RX ORDER — NOREPINEPHRINE BITARTRATE/D5W 8 MG/250ML
0.05 PLASTIC BAG, INJECTION (ML) INTRAVENOUS
Qty: 8 | Refills: 0 | Status: DISCONTINUED | OUTPATIENT
Start: 2022-01-01 | End: 2022-01-01

## 2022-01-01 RX ORDER — NICARDIPINE HYDROCHLORIDE 30 MG/1
5 CAPSULE, EXTENDED RELEASE ORAL
Qty: 40 | Refills: 0 | Status: DISCONTINUED | OUTPATIENT
Start: 2022-01-01 | End: 2022-01-01

## 2022-01-01 RX ORDER — ROCURONIUM BROMIDE 10 MG/ML
100 VIAL (ML) INTRAVENOUS ONCE
Refills: 0 | Status: COMPLETED | OUTPATIENT
Start: 2022-01-01 | End: 2022-01-01

## 2022-01-01 RX ORDER — PROPOFOL 10 MG/ML
15 INJECTION, EMULSION INTRAVENOUS
Qty: 1000 | Refills: 0 | Status: DISCONTINUED | OUTPATIENT
Start: 2022-01-01 | End: 2022-01-01

## 2022-01-01 RX ORDER — PHENYLEPHRINE HYDROCHLORIDE 10 MG/ML
0.4 INJECTION INTRAVENOUS
Qty: 40 | Refills: 0 | Status: DISCONTINUED | OUTPATIENT
Start: 2022-01-01 | End: 2022-01-01

## 2022-01-01 RX ORDER — ALTEPLASE 100 MG
54.8 KIT INTRAVENOUS ONCE
Refills: 0 | Status: COMPLETED | OUTPATIENT
Start: 2022-01-01 | End: 2022-01-01

## 2022-01-01 RX ORDER — CHLORHEXIDINE GLUCONATE 213 G/1000ML
15 SOLUTION TOPICAL EVERY 12 HOURS
Refills: 0 | Status: DISCONTINUED | OUTPATIENT
Start: 2022-01-01 | End: 2022-01-01

## 2022-01-01 RX ORDER — ALTEPLASE 100 MG
6.1 KIT INTRAVENOUS ONCE
Refills: 0 | Status: COMPLETED | OUTPATIENT
Start: 2022-01-01 | End: 2022-01-01

## 2022-01-01 RX ADMIN — Medication 400 MILLIGRAM(S): at 08:17

## 2022-01-01 RX ADMIN — SODIUM CHLORIDE 30 MILLILITER(S): 9 INJECTION INTRAMUSCULAR; INTRAVENOUS; SUBCUTANEOUS at 23:00

## 2022-01-01 RX ADMIN — ALTEPLASE 54.8 MILLIGRAM(S): KIT at 19:38

## 2022-01-01 RX ADMIN — CHLORHEXIDINE GLUCONATE 15 MILLILITER(S): 213 SOLUTION TOPICAL at 17:07

## 2022-01-01 RX ADMIN — PROPOFOL 6.3 MICROGRAM(S)/KG/MIN: 10 INJECTION, EMULSION INTRAVENOUS at 23:16

## 2022-01-01 RX ADMIN — CHLORHEXIDINE GLUCONATE 15 MILLILITER(S): 213 SOLUTION TOPICAL at 05:31

## 2022-01-01 RX ADMIN — Medication 5 MILLIGRAM(S): at 14:58

## 2022-01-01 RX ADMIN — VASOPRESSIN 3 UNIT(S)/MIN: 20 INJECTION INTRAVENOUS at 23:14

## 2022-01-01 RX ADMIN — SODIUM CHLORIDE 1000 MILLILITER(S): 9 INJECTION INTRAMUSCULAR; INTRAVENOUS; SUBCUTANEOUS at 19:00

## 2022-01-01 RX ADMIN — Medication 1000 MILLIGRAM(S): at 08:30

## 2022-01-01 RX ADMIN — Medication 100 MILLIGRAM(S): at 18:55

## 2022-01-01 RX ADMIN — CHLORHEXIDINE GLUCONATE 15 MILLILITER(S): 213 SOLUTION TOPICAL at 23:18

## 2022-01-01 RX ADMIN — PROPOFOL 6.3 MICROGRAM(S)/KG/MIN: 10 INJECTION, EMULSION INTRAVENOUS at 19:00

## 2022-01-01 RX ADMIN — ETOMIDATE 20 MILLIGRAM(S): 2 INJECTION INTRAVENOUS at 18:55

## 2022-01-01 RX ADMIN — SODIUM CHLORIDE 1000 MILLILITER(S): 9 INJECTION INTRAMUSCULAR; INTRAVENOUS; SUBCUTANEOUS at 23:13

## 2022-01-01 RX ADMIN — Medication 6.35 MICROGRAM(S)/KG/MIN: at 23:14

## 2022-01-01 RX ADMIN — Medication 6.35 MICROGRAM(S)/KG/MIN: at 12:16

## 2022-01-01 RX ADMIN — CHLORHEXIDINE GLUCONATE 1 APPLICATION(S): 213 SOLUTION TOPICAL at 05:35

## 2022-01-01 RX ADMIN — Medication 5 MILLIGRAM(S): at 14:38

## 2022-01-01 RX ADMIN — PHENYLEPHRINE HYDROCHLORIDE 10.2 MICROGRAM(S)/KG/MIN: 10 INJECTION INTRAVENOUS at 01:40

## 2022-01-01 RX ADMIN — ALTEPLASE 366 MILLIGRAM(S): KIT at 19:38

## 2022-03-09 PROBLEM — M70.50 PES ANSERINE BURSITIS: Status: ACTIVE | Noted: 2021-01-01

## 2022-03-09 PROBLEM — M25.559 HIP PAIN: Status: ACTIVE | Noted: 2022-01-01

## 2022-03-09 PROBLEM — T84.84XA PAINFUL TOTAL KNEE REPLACEMENT: Status: ACTIVE | Noted: 2021-01-01

## 2022-03-09 NOTE — HISTORY OF PRESENT ILLNESS
[de-identified] : Ms. CAMARILLO is a pleasant 81 year old female who is being seen for follow-up for bilateral knee. \par 81-year-old female here today for follow-up of her left greater than right knee pain.  Overall patient doing extremely well.  She has been in physical therapy since her last visit.  States that the pain in her knee is very much improved.  She is now able to ambulate without issue.  States that when she was at physical therapy 2 weeks ago she felt a pull in her right leg and was having lateral sided hip and groin pain.  States it has been better over the last week or 2.  Is not been back to physical therapy since this happened.  Is still able to ambulate.  Denies any mechanical symptoms in the hip.  Is overall happy with her progress with her left knee.

## 2022-03-09 NOTE — PHYSICAL EXAM
[de-identified] : GENERAL APPEARANCE: Well nourished and hydrated, pleasant, alert, and oriented x 3. Appears their stated age. \par HEENT: Normocephalic, extraocular eye motion intact. Nasal septum midline. Oral cavity clear. External auditory canal clear. \par RESPIRATORY: Breath sounds clear and audible in all lobes. No wheezing, No accessory muscle use.\par CARDIOVASCULAR: No apparent abnormalities. No lower leg edema. No varicosities. Pedal pulses are palpable.\par NEUROLOGIC: Sensation is normal, no muscle weakness in the upper or lower extremities.\par DERMATOLOGIC: No apparent skin lesions, moist, warm, no rash.\par SPINE: Cervical spine appears normal and moves freely; thoracic spine appears normal and moves freely; lumbosacral spine appears normal and moves freely, normal, nontender.\par MUSCULOSKELETAL: Hands, wrists, and elbows are normal and move freely, shoulders are normal and move freely. \par Psychiatric: Oriented to person, place, and time, insight and judgement were intact and the affect was normal. \par Musculoskeletal:. Left knee exam shows mild effusion, ROM is 0-1 30 degrees, medial joint line tenderness.  There is tenderness palpation over the pes insertion.  There is excessive anterior drawer of 5 to 7 mm with some mild laxity in flexion.\par 5/5 motor strength in bilateral lower extremities. Sensory: Intact in bilateral lower extremities. DTRs: Biceps, brachioradialis, triceps, patellar, ankle and plantar 2+ and symmetric bilaterally. Pulses: dorsalis pedis, posterior tibial, femoral, popliteal, and radial 2+ and symmetric bilaterally. \par Constitutional: Alert and in no acute distress, but well-appearing. \par Musculoskeletal:. Right knee exam shows no effusion, ROM is 0-1 30 degrees, no instability, no joint line tenderness. \par 5/5 motor strength in bilateral lower extremities. Sensory: Intact in bilateral lower extremities. DTRs: Biceps, brachioradialis, triceps, patellar, ankle and plantar 2+ and symmetric bilaterally. Pulses: dorsalis pedis, posterior tibial, femoral, popliteal, and radial 2+ and symmetric bilaterally. \par Constitutional: Alert and in no acute distress, but well-appearing. \par Musculoskeletal: ambulates normally. Right hip exam showed mild groin pain with SLR, ROM is full with pain at extremes of internal/external rotation, BRIANNA negative, FADIR negative.\par 5/5 motor strength in bilateral lower extremities. Sensory: Intact in bilateral lower extremities. DTRs: Biceps, brachioradialis, triceps, patellar, ankle and plantar 2+ and symmetric bilaterally. Pulses: dorsalis pedis, posterior tibial, femoral, popliteal, and radial 2+ and symmetric bilaterally. \par \par  [de-identified] : AP pelvis and 2 views of the right hip obtained in the office today show no acute fracture or dislocation.  Mild degenerative changes osteoarthritis noted.\par \par 2 views of the right femur obtained in the office today show no acute fracture or dislocation.

## 2022-03-09 NOTE — DISCUSSION/SUMMARY
[Medication Risks Reviewed] : Medication risks reviewed [de-identified] : Patient is an 81-year-old female with bilateral total knee replacements is presenting today for follow-up of her left knee pain.  Overall she is doing very well physical therapy.  She is having improvement in her pain and is no longer having any effusions.  She is able to ambulate without issue.  Overall she is happy with her progress.  I therefore recommended she continue with conservative treatment at this time.  She should continue with physical therapy and I given her prescription for that.  She should take NSAIDs as needed for the pain.  I will see her back on an as-needed basis for her left knee.\par \par As for her right hip I do think she is having a mild arthritis exacerbation as well as muscle pull that are now resolving with conservative treatment.  She is continue take NSAIDs as needed for the pain.  She had no fall and no trauma to the right hip and has no pain with logroll or rotation on exam today.  She should continue to work on at home therapy.  I will see her back in as-needed basis for her right leg.  All questions asked and answered.

## 2022-11-23 NOTE — ED PROVIDER NOTE - OBJECTIVE STATEMENT
83 y/o female presents to the ED with AMS. pt was eating dinner and pt family states pt became confused and then slumped onto , ems was called left dilated compared to right, unknown baseline, pt was decerebrating, posturing no gag reflex on arrival to ed.

## 2022-11-23 NOTE — ED PROCEDURE NOTE - NS ED PROCEDURE ASSISTED BY
The procedure was performed independently Deterioration of Condition En Route/Death or Disability/Increased Pain/Transportation Risk (There is always a risk of traffic delays resulting in deterioration of condition.)

## 2022-11-23 NOTE — CHART NOTE - NSCHARTNOTEFT_GEN_A_CORE
Neurointerventional Surgery Post Procedure Note    Procedure: Selective Cerebral Angiography   Mechanical and suction thrombectomy.    Pre- Procedure Diagnosis: Stroke   Post- Procedure Diagnosis: Stroke    : Dr. Te Fu MD  First Assistant: Dr. Mason Burch MD  Nurse: Gloria Curry RN  Anesthesiologist: Dr Manriuqe                                          Radiology Tech: Freddie MÉNDEZ    Sheath:  -6 Fr BMX Gabonese Sheath  Blood loss 30 cc    Preliminary Report:  Under a 6 Gabonese BMX 90 cm sheath via the right groin under MAC sedation via left vertebral artery,  was performed and reveals Left PCA occlusion right at its origin . Mechanical and suction thrombectomy was performed    ( 5 passes) and TICI 3 reperfusion was achieved.   . ( Official note to follow).    Patient tolerated procedure well.  Patient remains hemodynamically stable, no change in neurological status compared to baseline.  Results were discussed with Neuro ICU team and family.  Right groin sheath  was discontinued. Hemostasis was obtained with Angioseal and approximately 15 minutes of manual compression.     No active bleeding, no hematoma, no ecchymosis.   Quick clot and safeguard balloon dressing applied at   Patient transferred to recovery room in stable condition. Neurointerventional Surgery Post Procedure Note    Procedure: Selective Cerebral Angiography   Mechanical and suction thrombectomy.    Pre- Procedure Diagnosis: Stroke   Post- Procedure Diagnosis: Stroke    : Dr. Te Fu MD  First Assistant: Dr. Mason Burch MD  Nurse: Gloria Curry RN  Anesthesiologist: Dr Manrique                                          Radiology Tech: Freddie Grigsby RT    Sheath:  -6 Fr BMX Slovak Sheath  Blood loss 30 cc    Preliminary Report:  Under a 6 Slovak BMX 90 cm sheath via the right groin under MAC sedation via left vertebral artery,  was performed and reveals top of basilar and Left P1 PCA occlusion. Mechanical thrombectomy was performed  ( 5 passes) and TICI 3 reperfusion was achieved.   . ( Official note to follow).  Denisse CT showed contrast within the ambien and quadrigeminal cisterns mixed of blood and contrast densities. .  At end of procedure, patient's left pupil returned to normal size and reactivity.     Patient tolerated procedure well.  Patient remained hemodynamically stable, no worsening change in neurological status was seen compared to baseline (but she is sedated and paralyzed).  Given the concern for subarachnoid hemorrhage, type and screen was sent along with order for STAT cryoprecipitate to reverse IV tPA.     Results were discussed with Neuro ICU team and family.  Right groin sheath  was discontinued. Hemostasis was obtained with Angioseal and approximately 15 minutes of manual compression.     No active bleeding, no hematoma, no ecchymosis.   Quick clot and safeguard balloon dressing applied.   Patient transferred to ICU for recovery.

## 2022-11-23 NOTE — CHART NOTE - NSCHARTNOTEFT_GEN_A_CORE
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 Intubated for airway protection in the ED.   Thrombectomy candidate and proceeding to mechanical thrombectomy from ED.     Exam:  NIHSS:      Given emergent nature of disease, procedure will be performed emergently as per our hospital standards.   Full H&P to be completed by NeuroICU. 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 Intubated for airway protection in the ED.   Thrombectomy candidate and proceeding to mechanical thrombectomy from ED.     Exam:  NIHSS: 28  · NIH Stroke Scale: LOC	(3) Responds only with reflex motor or autonomic effects or totally unresponsive, flaccid, and areflexic   · NIH Stroke Scale: LOC Question	(2) Answers neither question correctly   · NIH Stroke Scale: LOC Command	(2) Performs neither task correctly   · NIH Stroke Scale: Arm Left	(4) No movement   · NIH Stroke Scale: Arm Right	(4) No movement   · NIH Stroke Scale: Leg Left	(4) No movement   · NIH Stroke Scale: Leg Right	(4) No movement   · NIH Stroke Scale: Ataxia	(2) Present in two limbs   · NIH Stroke Scale: Sensory	(2) Severe to total sensory loss; patient is not aware of being touched in the face, arm, and leg   · NIH Stroke Scale: Language	(3) Mute, global aphasia; no usable speech or auditory comprehension   · NIH Stroke Scale: Dysarthria	(UN) Intubated or other physical barrier       Given emergent nature of disease, procedure acute thrombectomy will be performed emergently as per our hospital standards.   Full H&P to be completed by NeuroICU.

## 2022-11-23 NOTE — CONSULT NOTE ADULT - SUBJECTIVE AND OBJECTIVE BOX
Case discussed with Dr. Leo at 703pm.   NIHSS 28  preMRS 0  83 YO F was in the restaurant eating dinner when suddenly she lost conciseness and became unresponsive. she was rushed to the ER and was noted to be decerebrate posturing. Pt was intubated for airway protection and was rushed to the CT scanner. She was found to have a left P1 thrombus. Risks and benefits of tPA were discussed with pt's family by Dr. Rodríguez. Pt qualified for tPA and received a bolus followed by infusion. Per pt's family, she was on Xarelto, but stopped it 5 days prior.  Risks and Benefits of mechanical thrombectomy were discussed with pt's son and , both expressed understanding and elected to proceed.  - post thrombectomy pt will be admitted to NSICU for close monitoring  Case discussed w/Dr. Fu, GERMÁN attending, and Dr. Leo, ED attending  will follow

## 2022-11-23 NOTE — ED PROVIDER NOTE - PROGRESS NOTE DETAILS
Malcom Artis for ED attending, Dr. Leo: code biplane called Malcom Artis for ED attending, Dr. Leo: pt off Xarelto for 5 days, is tpa candidate Pt intubated for airway protection, tpa delayed for this reason, spoke to neuro at 1908

## 2022-11-23 NOTE — ED PROVIDER NOTE - PHYSICAL EXAMINATION
Followed protocol Gen: unresponsive  Head: NC/AT, left pupil dilated  Neck: trachea midline  Card: regular rate and rhythm  Resp:  CTAB  Abd: soft, non-distended, no evidence of trauma  Ext: no deformities  Neuro:  unresponsive, posturing, GCS 3 unable to completely assess NIHSS due to gcs 3 however score likely >32 based of initial assessment    Skin:  Warm and dry as visualized

## 2022-11-23 NOTE — ED ADULT NURSE NOTE - OBJECTIVE STATEMENT
as per EMS pt was at a restaurant and suddenly slumped over in chair around 2015 and became unresponsive, pt arrived unresponsive as per EMS pt was at a restaurant and suddenly slumped over in chair around 1815 and became unresponsive, pt arrived unresponsive as per EMS pt was at a restaurant and suddenly slumped over in chair around 1815 and became unresponsive, pt arrived unresponsive was intubated and then brought to CT

## 2022-11-24 NOTE — H&P ADULT - NSVTERISKASSESS_GEN_ALL_CORE FT
Medical Assessment Completed on: 24-Nov-2022 02:58 Relief of constipation A suppository was given in the hospital to relieve the current symptoms. Adding miralax to the diet and increasing  fruits, vegetables and drinking a lot of water to help prevent symptoms in the future.    Apply bacitracin to the area twice daily and follow up with your pediatrician within 48 hours.

## 2022-11-24 NOTE — DISCHARGE NOTE NURSING/CASE MANAGEMENT/SOCIAL WORK - PATIENT PORTAL LINK FT
You can access the FollowMyHealth Patient Portal offered by [facility] by registering at the following website: [email or web address]. By joining [facility]’s FollowMyHealth portal, you will also be able to view your health information using other applications (apps) compatible with our system.

## 2022-11-24 NOTE — CHART NOTE - NSCHARTNOTEFT_GEN_A_CORE
Patient underwent brain death testing today. I concur with the findings of Dr. Quinn. Patient pronounced at 14:34.       Freddie Chapman M.D.  , Pulmonary & Critical Care Medicine  [facility]  Pulmonary and Sleep Medicine at [place]  [address]  [place] [address]  T: [phone]  F: [phone]

## 2022-11-24 NOTE — CONSULT NOTE ADULT - ASSESSMENT
81 Y/O female admitted w/ AMS. 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 Pt intubated for airway protection and Code Biplane initiated.       Neuro:  -Q1hr neuro  -Propofol for sedation    CV:  -maintain systolic  to 150   -pressor support if needed    Pulm:  -maintain O2 SAT > or = to 94%  -vent support as needed    GI:  -NPO    :  -monitor I/O   -replace lytes PRN    Heme:  -SCDs for DVT prophylaxis     ID:  -maintain normothermia

## 2022-11-24 NOTE — CONSULT NOTE ADULT - SUBJECTIVE AND OBJECTIVE BOX
HPI:  83 Y/O female admitted w/ AMS. 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 Pt intubated for airway protection and Code Biplane initiated.     HISTORY OF PRESENT ILLNESS:   82yF PMH       REVIEW OF SYSTEMS  Pt unable to provide 2/2 AMS.      Neuro:  propofol Infusion 15 MICROgram(s)/kG/Min IV Continuous <Continuous>    Anticoagulation:    OTHER:  chlorhexidine 0.12% Liquid 15 milliLiter(s) Oral Mucosa every 12 hours  chlorhexidine 4% Liquid 1 Application(s) Topical <User Schedule>  hydrALAZINE Injectable 10 milliGRAM(s) IV Push every 2 hours PRN  labetalol Injectable 10 milliGRAM(s) IV Push every 2 hours PRN  niCARdipine Infusion 5 mG/Hr IV Continuous <Continuous>  norepinephrine Infusion 0.05 MICROgram(s)/kG/Min IV Continuous <Continuous>    IVF:  sodium chloride 0.9%. 50 milliLiter(s) IV Continuous <Continuous>      Vital Signs Last 24 Hrs  T(C): 38.1 (24 Nov 2022 08:00), Max: 38.1 (24 Nov 2022 08:00)  T(F): 100.6 (24 Nov 2022 08:00), Max: 100.6 (24 Nov 2022 08:00)  HR: 68 (24 Nov 2022 08:15) (36 - 101)  BP: 150/56 (24 Nov 2022 08:15) (73/39 - 187/109)  BP(mean): 79 (24 Nov 2022 08:15) (51 - 129)  RR: 20 (24 Nov 2022 08:15) (10 - 22)  SpO2: 100% (24 Nov 2022 08:15) (94% - 100%)    Parameters below as of 24 Nov 2022 08:00  Patient On (Oxygen Delivery Method): ventilator    O2 Concentration (%): 30      Physical Exam: GENERAL: NAD, well-groomed  HEAD:  Atraumatic, normocephalic  EYES: Conjunctiva and sclera clear; absent corneals   NECK: Supple, no JVD, normal thyroid  KELLY COMA SCORE: E-1 V-1T M- 1=3T       E: 4= opens eyes spontaneously 3= to voice 2= to noxious 1= no opening       V: 5= oriented 4= confused 3= inappropriate words 2= incomprehensible sounds 1= nonverbal 1T= intubated       M: 6= follows commands 5= localizes 4= withdraws 3= flexor posturing 2= extensor posturing 1= no movement  MENTAL STATUS: No EO, No FC, no response to noxious, no corneals, no cough, no gag  CHEST/LUNG: +breath sounds bilaterally; no rales, rhonchi, wheezing, appreciated  HEART:  Regular rate and rhythm; no murmurs, rubs  ABDOMEN: Soft, nondistended; bowel sounds present  EXTREMITIES: no clubbing, cyanosis, or edema  LYMPH: No lymphadenopathy noted  SKIN: Warm, dry        LABS:                        9.9    12.06 )-----------( 192      ( 24 Nov 2022 01:10 )             31.7     11-24    142  |  113<H>  |  31.3<H>  ----------------------------<  208<H>  4.6   |  17.0<L>  |  1.54<H>    Ca    7.2<L>      24 Nov 2022 01:10  Phos  5.5     11-24  Mg     1.8     11-24    TPro  5.7<L>  /  Alb  3.0<L>  /  TBili  0.3<L>  /  DBili  x   /  AST  91<H>  /  ALT  56<H>  /  AlkPhos  98  11-24    PT/INR - ( 23 Nov 2022 19:01 )   PT: 12.3 sec;   INR: 1.06 ratio         PTT - ( 23 Nov 2022 19:01 )  PTT:32.2 sec

## 2022-11-24 NOTE — H&P ADULT - NSHPLABSRESULTS_GEN_ALL_CORE
ACC: [id number] EXAM:  CT BRAIN                        PROCEDURE DATE:  11/24/2022    IMPRESSION:  Interval increase in the degree of extensive bilateral areas of   hyperdensity throughout the subarachnoid spaces consistent with   hemorrhage and/or contrast agent.  Intraventricular hemorrhage/contrast agent also demonstrated.

## 2022-11-24 NOTE — H&P ADULT - HISTORY OF PRESENT ILLNESS
81 Y/O female admitted w/ AMS. 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 Pt intubated for airway protection and Code Biplane initiated.      83 Y/O female admitted w/ AMS. 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 Pt intubated for airway protection and Code Biplane initiated.   NIH on admission 34, IVH on admission 0

## 2022-11-24 NOTE — CHART NOTE - NSCHARTNOTESELECT_GEN_ALL_CORE
Family Update/Event Note
NeuroInterventional Note/Event Note
Neurointerventional Surgery Post Procedure Note/Event Note
Brain Death Exam/Event Note
Event Note
family update/Event Note

## 2022-11-24 NOTE — DISCHARGE NOTE NURSING/CASE MANAGEMENT/SOCIAL WORK - NSDCPEFALRISK_GEN_ALL_CORE
For information on Fall & Injury Prevention, visit: [email or web address] OR  [email or web address] OR  https://www.cdc.gov/steadi/patient.html

## 2022-11-24 NOTE — CONSULT NOTE ADULT - SUBJECTIVE AND OBJECTIVE BOX
Preliminary note, offical recommendations pending attending review/signature   HPI:  81 Y/O female admitted w/ AMS. 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 Pt intubated for airway protection and Code Biplane initiated and underwent mechanical thrombectomy    Admission NIHSS 28  Pre-MRS -unknown    SUBJECTIVE: Intubated, unable to obtain.    chlorhexidine 0.12% Liquid 15 milliLiter(s) Oral Mucosa every 12 hours  chlorhexidine 4% Liquid 1 Application(s) Topical <User Schedule>  hydrALAZINE Injectable 10 milliGRAM(s) IV Push every 2 hours PRN  labetalol Injectable 10 milliGRAM(s) IV Push every 2 hours PRN  niCARdipine Infusion 5 mG/Hr IV Continuous <Continuous>  norepinephrine Infusion 0.05 MICROgram(s)/kG/Min IV Continuous <Continuous>  phenylephrine    Infusion 0.4 MICROgram(s)/kG/Min IV Continuous <Continuous>  propofol Infusion 15 MICROgram(s)/kG/Min IV Continuous <Continuous>  sodium chloride 0.9%. 50 milliLiter(s) IV Continuous <Continuous>  vasopressin Infusion 0.02 Unit(s)/Min IV Continuous <Continuous>      PHYSICAL EXAM:   Vital Signs Last 24 Hrs  T(C): 37.7 (24 Nov 2022 05:45), Max: 37.7 (24 Nov 2022 05:45)  T(F): 99.9 (24 Nov 2022 05:45), Max: 99.9 (24 Nov 2022 05:45)  HR: 60 (24 Nov 2022 06:00) (36 - 101)  BP: 140/58 (24 Nov 2022 06:00) (73/39 - 187/109)  BP(mean): 80 (24 Nov 2022 06:00) (51 - 129)  RR: 19 (24 Nov 2022 06:00) (10 - 22)  SpO2: 100% (24 Nov 2022 06:00) (94% - 100%)    Parameters below as of 24 Nov 2022 04:15  Patient On (Oxygen Delivery Method): ventilator    O2 Concentration (%): 30    General: intubated    NEUROLOGICAL EXAM:  Mental status:   Cranial Nerves:   Motor exam:   Sensation:    Coordination/ Gait: gait not assessed     LABS:                        9.9    12.06 )-----------( 192      ( 24 Nov 2022 01:10 )             31.7    11-24    142  |  113<H>  |  31.3<H>  ----------------------------<  208<H>  4.6   |  17.0<L>  |  1.54<H>    Ca    7.2<L>      24 Nov 2022 01:10  Phos  5.5     11-24  Mg     1.8     11-24    TPro  5.7<L>  /  Alb  3.0<L>  /  TBili  0.3<L>  /  DBili  x   /  AST  91<H>  /  ALT  56<H>  /  AlkPhos  98  11-24  PT/INR - ( 23 Nov 2022 19:01 )   PT: 12.3 sec;   INR: 1.06 ratio         PTT - ( 23 Nov 2022 19:01 )  PTT:32.2 sec      IMAGING: Reviewed by me.   CT Head No Cont (11.24.22 @ 00:02)   Interval increase in the degree of of extensive bilateral areas of   hyperdensity throughout the subarachnoid spaces consistent with   hemorrhage and/or contrast agent.  Intraventricular hemorrhage/contrast agent also demonstrated.    (11.23.22 @ 19:18)   CT angiogram neck:  -No vaso-occlusive disease.  CT angiogram head:  -Basilar tip thrombosis with extension into the proximal left PCA and   SCA. Findings discussed with Dr. Leo at 7:18PM 11/23/2022. Recommend   emergent neuro-interventional consultation.  CT perfusion:  -Please note in general, perfusion imaging is limited in the posterior   fossa. 4 mL of at risk tissue is identified involving the left PCA   territory. This is likely underestimated.  -No completed core infarct identified.  Other:  -Mildly prominent mediastinal lymph nodes which could be reactive, but   metastatic disease cannot be ruled out. Recommend dedicated CT chest when   clinically feasible.    CT Brain Stroke Protocol (11.23.22 @ 19:02)   -Hyperdensity of the basilar tip representing thrombosis, better seen on   subsequent CTA. Emergent neuro-interventional consultation recommended.   -Possible evolving infarct involving the left midbrain.   -Partially visualized nasogastric tube coiling within the nasopharynx.   Replacement is recommended.         HPI:  81 Y/O female admitted w/ AMS. Pt was reportedly at dinner on the evening of 11/23/22 and suddenly experienced sudden loss of awareness and generalized weakness, slumped onto , then was brought to ED, concern for ischemic stroke on evaluation,  found to have basilar apex thrombus into the left P1, received iv TPA. Pt intubated for airway protection and Code Biplane initiated and underwent mechanical thrombectomy    Admission NIHSS 28  Pre-MRS -unknown    SUBJECTIVE: Intubated, unable to obtain.    chlorhexidine 0.12% Liquid 15 milliLiter(s) Oral Mucosa every 12 hours  chlorhexidine 4% Liquid 1 Application(s) Topical <User Schedule>  hydrALAZINE Injectable 10 milliGRAM(s) IV Push every 2 hours PRN  labetalol Injectable 10 milliGRAM(s) IV Push every 2 hours PRN  niCARdipine Infusion 5 mG/Hr IV Continuous <Continuous>  norepinephrine Infusion 0.05 MICROgram(s)/kG/Min IV Continuous <Continuous>  phenylephrine    Infusion 0.4 MICROgram(s)/kG/Min IV Continuous <Continuous>  propofol Infusion 15 MICROgram(s)/kG/Min IV Continuous <Continuous>  sodium chloride 0.9%. 50 milliLiter(s) IV Continuous <Continuous>  vasopressin Infusion 0.02 Unit(s)/Min IV Continuous <Continuous>      PHYSICAL EXAM:   Vital Signs Last 24 Hrs  T(C): 37.7 (24 Nov 2022 05:45), Max: 37.7 (24 Nov 2022 05:45)  T(F): 99.9 (24 Nov 2022 05:45), Max: 99.9 (24 Nov 2022 05:45)  HR: 60 (24 Nov 2022 06:00) (36 - 101)  BP: 140/58 (24 Nov 2022 06:00) (73/39 - 187/109)  BP(mean): 80 (24 Nov 2022 06:00) (51 - 129)  RR: 19 (24 Nov 2022 06:00) (10 - 22)  SpO2: 100% (24 Nov 2022 06:00) (94% - 100%)    Parameters below as of 24 Nov 2022 04:15  Patient On (Oxygen Delivery Method): ventilator    O2 Concentration (%): 30    General: intubated    NEUROLOGICAL EXAM:  Mental status: obtunded, not arrousable  Cranial Nerves: Pupils 7mm fixed/dilated, -occulocephalics, - corneals, - cough/gag  Motor exam/Sensation:  no withdrawal to noxious stimuli in any extremety  Coordination/ Gait: gait not assessed     LABS:                        9.9    12.06 )-----------( 192      ( 24 Nov 2022 01:10 )             31.7    11-24    142  |  113<H>  |  31.3<H>  ----------------------------<  208<H>  4.6   |  17.0<L>  |  1.54<H>    Ca    7.2<L>      24 Nov 2022 01:10  Phos  5.5     11-24  Mg     1.8     11-24    TPro  5.7<L>  /  Alb  3.0<L>  /  TBili  0.3<L>  /  DBili  x   /  AST  91<H>  /  ALT  56<H>  /  AlkPhos  98  11-24  PT/INR - ( 23 Nov 2022 19:01 )   PT: 12.3 sec;   INR: 1.06 ratio         PTT - ( 23 Nov 2022 19:01 )  PTT:32.2 sec      IMAGING: Reviewed by me.   CT Head No Cont (11.24.22 @ 00:02)   Interval increase in the degree of of extensive bilateral areas of   hyperdensity throughout the subarachnoid spaces consistent with   hemorrhage and/or contrast agent.  Intraventricular hemorrhage/contrast agent also demonstrated.    (11.23.22 @ 19:18)   CT angiogram neck:  -No vaso-occlusive disease.  CT angiogram head:  -Basilar tip thrombosis with extension into the proximal left PCA and   SCA. Findings discussed with Dr. Leo at 7:18PM 11/23/2022. Recommend   emergent neuro-interventional consultation.  CT perfusion:  -Please note in general, perfusion imaging is limited in the posterior   fossa. 4 mL of at risk tissue is identified involving the left PCA   territory. This is likely underestimated.  -No completed core infarct identified.  Other:  -Mildly prominent mediastinal lymph nodes which could be reactive, but   metastatic disease cannot be ruled out. Recommend dedicated CT chest when   clinically feasible.    CT Brain Stroke Protocol (11.23.22 @ 19:02)   -Hyperdensity of the basilar tip representing thrombosis, better seen on   subsequent CTA. Emergent neuro-interventional consultation recommended.   -Possible evolving infarct involving the left midbrain.   -Partially visualized nasogastric tube coiling within the nasopharynx.   Replacement is recommended.

## 2022-11-24 NOTE — H&P ADULT - ASSESSMENT
83 Y/O female admitted w/ AMS. 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 Pt intubated for airway protection and Code Biplane initiated.       Neuro:  -Q1hr neuro  -Propofol for sedation    CV:  -maintain systolic  to 150   -pressor support if needed    Pulm:  -maintain O2 SAT > or = to 94%  -vent support as needed    GI:  -NPO    :  -monitor I/O   -replace lytes PRN    Heme:  -SCDs for DVT prophylaxis     ID:  -maintain normothermia

## 2022-11-24 NOTE — CHART NOTE - NSCHARTNOTEFT_GEN_A_CORE
Family decided not to donate organs. I spoke with family and they said their goodbyes. Mr Singh is ready to remove the breathing tube today.

## 2022-11-24 NOTE — H&P ADULT - NSHPPHYSICALEXAM_GEN_ALL_CORE
GENERAL: NAD, well-groomed, well-developed  HEAD:  Atraumatic, normocephalic  DRAINS:   WOUND: Dressing clean dry intact; well healed  SHUNT: easily compressible and refills  EYES: Conjunctiva and sclera clear; corneal reflex intact  ENMT: No tonsillar erythema, exudates, or enlargement; moist mucous membranes, good dentition, no lesions  NECK: Supple, no JVD, dormal thyroid  KELLY COMA SCORE: E- V- M- =       E: 4= opens eyes spontaneously 3= to voice 2= to noxious 1= no opening       V: 5= oriented 4= confused 3= inappropriate words 2= incomprehensible sounds 1= nonverbal 1T= intubated       M: 6= follows commands 5= localizes 4= withdraws 3= flexor posturing 2= extensor posturing 1= no movement  MENTAL STATUS: AAO x3; Awake/Comatose; Opens eyes spontaneously/to voice/to light touch/to noxious stimuli; Appropriately conversant without aphasia/Nonverbal; following simple commands/mimicking/not following commands  CRANIAL NERVES: Visual acuity normal for age, visual fields full to confrontation, PERRL. EOMI without nystagmus. Facial sensation intact V1-3 distribution b/l. Face symmetric w/ normal eye closure and smile, tongue midline. Hearing grossly intact. Speech clear. Head turning and shoulder shrug intact.   REFLEXES: Doll's eyes positive. Blinks to threat. Gag reflex intact. No pronator drift; DTRs 2+ intact and symmetric; negative Hanson's b/l; negative clonus b/l; no upper extremity dysmetria  MOTOR: strength 5/5 b/l upper and lower extremities  Uppers     Delt     Bicep     Tricep     HG  R                5/5       5/5         5/5         5/5  L                5/5       5/5         5/5         5/5  Lowers      HF     KF      KE     PF     DF     EHL  R             5/5     5/5     5/5    5/5   5/5    5/5  L              5/5    5/5      5/5    5/5   5/5    5/5  SENSATION: grossly intact to light touch all extremities  COORDINATION: Gait intact; rapid alternating movements intact b/l upper extremities; no upper extremity dysmetria  SENSATION: grossly intact to light touch all extremities  MUSCLE STRETCH REFLEXES: DTRs 2+ intact and symmetric; no Hanson's b/l; no clonus b/l  PLANTAR: upgoing/downgoing/mute (Babinski)  CHEST/LUNG: Clear to auscultation bilaterally; no rales, rhonchi, wheezing, or rubs  HEART: +S1/+S2; Regular rate and rhythm; no murmurs, rubs, or gallops  ABDOMEN: Soft, nontender, nondistended; bowel sounds present all four quadrants  EXTREMITIES:  2+ peripheral pulses, no clubbing, cyanosis, or edema  LYMPH: No lymphadenopathy noted  SKIN: Warm, dry; no rashes or lesions GENERAL: NAD, well-groomed  HEAD:  Atraumatic, normocephalic  EYES: Conjunctiva and sclera clear; absent corneals   NECK: Supple, no JVD, normal thyroid  KELLY COMA SCORE: E-1 V-1T M- 1=3T       E: 4= opens eyes spontaneously 3= to voice 2= to noxious 1= no opening       V: 5= oriented 4= confused 3= inappropriate words 2= incomprehensible sounds 1= nonverbal 1T= intubated       M: 6= follows commands 5= localizes 4= withdraws 3= flexor posturing 2= extensor posturing 1= no movement  MENTAL STATUS: No EO, No FC, no response to noxious, no corneals, no cough, no gag  CHEST/LUNG: +breath sounds bilaterally; no rales, rhonchi, wheezing, appreciated  HEART:  Regular rate and rhythm; no murmurs, rubs  ABDOMEN: Soft, nondistended; bowel sounds present  EXTREMITIES: no clubbing, cyanosis, or edema  LYMPH: No lymphadenopathy noted  SKIN: Warm, dry

## 2022-11-24 NOTE — DISCHARGE NOTE FOR THE EXPIRED PATIENT - HOSPITAL COURSE
82F who was brought to ED after sudden onset LOC, weakness, NIH 34 on arrival. 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 Taken for mechanical thrombectomy, TICI 3C recanalization. Post procedure found to have severe SAH and IVH, no intervention per neurosurgery 2/2 poor initial exam and extensive nature of hemorrhage. Patient progressed to brain death and was declared on 11/24/22 at 1434. Family notified of death via telephone.

## 2022-11-24 NOTE — PATIENT PROFILE ADULT - FALL HARM RISK - HARM RISK INTERVENTIONS

## 2022-11-24 NOTE — CONSULT NOTE ADULT - ASSESSMENT
ASSESSMENT: 81 Y/O women was reportedly at dinner the evening of 11/23/22 and suddenly experienced sudden loss of awareness and generalized weakness, slumped onto , then was brought to ED, CT revealed basilar tip hyperdensity consistent with thrombosis, evolving left midbrain infarcts, CTA Head/neck revealed basilar tip thrombosis extending to the proximal left PCA and SCA.  Patient received iv TPA. Pt intubated for airway protection and taken to neuro IR where a Left PCA occlusion was found at its origin . Subsequently underwent  Mechanical and suction thrombectomy with TICI 3 reperfusion.  Embolic stroke of undetermined source. Noted bilateral subarachnoid hypervenosity possibly regions of hemorrhage vs. contrast extravasation     NEURO: Continue close monitoring for neurologic deterioration, SBP ICU goal 110-150mmHg post recanalization , titrate statin to LDL goal less than 70 upon improvement of LFT if applicable, MRI Brain w/o, MRA Head w/o and Neck w/contrast once stable, Physical therapy/OT/Speech eval/treatment.     ANTITHROMBOTIC THERAPY: none at this time, patient in 24 hour post TPA window, will need repeat CTH at 24 hours post administration to determine timeline for initiation as concern for underlying SAH.     PULMONARY: intubated, vent care per ICU    CARDIOVASCULAR: check TTE, cardiac monitoring                              SBP goal: 110-150mmhg     GASTROINTESTINAL:  dysphagia screen pending, intubated, elevated transaminase- monitor closely        Diet: NPO    RENAL: BUN/Cr elevated, hydration as tolerated, good urine output      Na Goal: Greater than 135     [place]:    HEMATOLOGY: H/H with anemia, monitor for si/sx of bleeding, Platelets 192, consideration for malignancy on CTA given multiple mediastinal lymph nodes possibly reactive but would need to rule out metastatic disease with dedicated CT Chest when stable. R/O DVT on admission given noted possibly malignancy as high risk for presence on admission.      DVT ppx: SCD for now , pending findings of 24 hour post TPA CTH will determine timeline for initiation of pharmacological dvt ppx     ID: afebrile,  leukocytosis , monitor for infection, RVP/Covid not detected.    OTHER:    DISPOSITION: Rehab or home depending on PT eval once stable and workup is complete      CORE MEASURES:        Admission NIHSS: 28     TPA: [x] YES [] NO      LDL/HDL/A1C: pending      Depression Screen: pending      Statin Therapy: pending     Dysphagia Screen: [] PASS [] FAIL - intubated      Smoking [] YES [] NO - pending      Afib [] YES [x] NO     Stroke Education [x] YES [] NO      ASSESSMENT: 83 Y/O women was reportedly at dinner the evening of 11/23/22 and suddenly experienced sudden loss of awareness and generalized weakness, slumped onto , then was brought to ED, CT revealed basilar tip hyperdensity consistent with thrombosis, evolving left midbrain infarcts, CTA Head/neck revealed basilar tip thrombosis extending to the proximal left PCA and SCA.  Patient received iv TPA. Pt intubated for airway protection and taken to neuro IR where a Left PCA occlusion was found at its origin . Subsequently underwent  Mechanical and suction thrombectomy with TICI 3 reperfusion.  Embolic stroke of determined source - AFib off AC for unknown reason. Post procedure was noted to have SAH and contrast extravasation. Over night pt's neurological exam deteriorated. This morning she has no brainstem reflexes, she's off sedation and unarousable  - neurological prognosis is grave     NEURO: recommend family discussion about goals of care  - consider palliative care consult    ANTITHROMBOTIC THERAPY: none at this time    PULMONARY: intubated, vent care per ICU    CARDIOVASCULAR: check TTE, cardiac monitoring                              SBP goal: 110-150mmhg     GASTROINTESTINAL:  dysphagia screen pending, intubated, elevated transaminase- monitor closely        Diet: NPO    RENAL: BUN/Cr elevated, hydration as tolerated, good urine output      Na Goal: Greater than 135     [place]:    HEMATOLOGY: H/H with anemia, monitor for si/sx of bleeding, Platelets 192, consideration for malignancy on CTA given multiple mediastinal lymph nodes possibly reactive but would need to rule out metastatic disease with dedicated CT Chest when stable. R/O DVT on admission given noted possibly malignancy as high risk for presence on admission.      DVT ppx: SCD for now , pending findings of 24 hour post TPA CTH will determine timeline for initiation of pharmacological dvt ppx     ID: afebrile,  leukocytosis , monitor for infection, RVP/Covid not detected.    DISPOSITION: pending clinical course      CORE MEASURES:        Admission NIHSS: 28     TPA: [x] YES [] NO      LDL/HDL/A1C: pending      Depression Screen: pending      Statin Therapy: pending     Dysphagia Screen: [] PASS [] FAIL - intubated      Smoking [] YES [] NO - pending      Afib [] YES [x] NO     Stroke Education [x] YES [] NO

## 2022-11-24 NOTE — CHART NOTE - NSCHARTNOTEFT_GEN_A_CORE
I updated  and family at bedside. Pt no longer has brainstem reflexes. Will perform brain death testing this afternoon.

## 2022-11-24 NOTE — CHART NOTE - NSCHARTNOTEFT_GEN_A_CORE
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  There were no CNS depressant drugs used in this pt  The blood pressure was at an acceptable range during test  Pt is absent of conditions or medications which may confound examination  Core temp was above 36*  Pt has absence of motor response to pain in all four limbs  Pupils are nonreactive to bright light,   Absent oculocephalic reflex  No deviation of eyes to cold caloric testing  Absent corneal reflex  No gag reflex or cough  response to tracheobronchial suctioning  Apnea test: respirations absent and paCO2 was greater than 60 (61) and there was a greater than 20mmHg above baseline ( 38)     Test was finished with ABG results in system @14:34.       Dr Angie Quinn was present for the procedure and confirmed Brain death time to be 11/24/22 @ 14:34.

## 2022-11-24 NOTE — CONSULT NOTE ADULT - NS ATTEND AMEND GEN_ALL_CORE FT
Agree with PA's assessment and plan. Unfortunate 81YO F w/tip of the basilar thrombus extending to left P1, received IV tPA in the ER and was transported to angio for mechanical thrombectomy. Pt underwent thrombectomy with TICI 3 recanalization, unfortunately repeat CT post procedure showed SAH mixed with contrast. Pt's neurological condition   - recommend goals of care discussion with family  please call with questions

## 2022-11-24 NOTE — H&P ADULT - CRITICAL CARE ATTENDING COMMENT
Pt seen and examined. Agree with above assessment and plan.  81 Y/O female admitted w/ AMS found to have basilar occlusion. TICI 3 thrombectomy complicated by ICH    pupils NR  no cough/gag/corneals  overbreathing vent  ext bilat upper ext  intubated    Neurochecks q1hr  MAP>65, SBP<140  repeat CTH  NPO  no chemical DVT ppx due to SAH  s/p tpa and reversal with cryo

## 2023-01-16 NOTE — ED ADULT TRIAGE NOTE - IDEAL BODY WEIGHT(KG)
Physical Therapy    Visit Type: initial evaluation and discharge  Precautions:  Medical precautions:  fall risk; standard precautions.    Lines:     Basic: capped IV      Lines in chart and on patient reviewed, precautions maintained throughout session.  Lower Extremity:    Left:  weight bearing: as tolerated.  Safety Measures: bed rails  SUBJECTIVE  Patient agreed to participate in therapy this date.    Patient is a 69 year old female admitted to [facility] for Lt TKA. Pt lives with S.O. in a single level home with 7 ADALID; doesn't need to navigate stairs to attic or basement initially. Has a standard toilet with riser and commode, as well as a walk-in shower chair with seat. At baseline, patient is Modified Independent with ADLs and Modified Independent with functional mobility tasks using standard cane.     Patient / Family Goal: return to previous functional status, maximize function and return home     OBJECTIVE     Cognitive Status   Orientation    - Oriented to: person, place and time    Vitals:  SBP 140s-150s throughout session, mild lightheadedness but without drop in BP      Range of Motion (ROM)   (degrees unless noted; active unless noted; norms in ( ); negative=lacking to 0, positive=beyond 0)  Comments: Left knee flexion >90 degrees, extension lacking 5 degrees      Standing Balance  (WALLACE = base of support)  Firm Surface: Double Leg      - Static, Eyes Open       - Trial 1 details: modified independent and with double UE support      Sensation/Dermatome Testing:    Diminished sensation anterior thigh    Bed Mobility  - Supine to sit: modified independent  - Sit to supine: modified independent  Difficulty due to bed height  Transfers  Assistive devices: 2-wheeled walker  - Sit to stand: modified independent  - Stand to sit: modified independent  - Stand pivot: modified independent    Ambulation / Gait  - Assistive device: gait belt  - Distance (feet unless otherwise indicated): 40  - Assist Level: modified  independent    Stair Ambulation  - Number of steps: 1;   - Assist Level: contact guard/touching/steadying assist  Second Trial  Recalls sequence with stepping; assist for safety only          Interventions     Supine    Lower Extremity: Left: heel slides, quad sets, ankle pumps and TKE over bolster, AROM, 5 reps, 1 sets  Seated    Lower Extremity: Left: knee flexion, AROM, 5 reps, 1 sets  Training provided: gait training, HEP training, stair training, transfer training, bed mobility training and safety training    Skilled input: Verbal instruction/cues  Verbal Consent: Writer verbally educated and received verbal consent for hand placement, positioning of patient, and techniques to be performed today from patient          ASSESSMENT     Visit # since seen by PT:  0    Patient is progressing as anticipated s/p Left TKA.  She is able to ambulate 40 feet and perform HEP without  assistance. Pt manages stairs without difficulty, assist for safety only. Pt has no further questions or concerns for same day discharge to home post-op. At this time, the patient has met all inpatient therapy goals and from a mobility standpoint IS safe for discharge from [facility] with OPPT, family assist and walker use.    Discharge Recommendations  Recommendation for Discharge Location: PT WI: Home with Outpatient therapy  Recommendation for Discharge Support: PT WI: 24 Hour assist  PT/OT Mobility Equipment for Discharge: has ww  PT/OT ADL Equipment for Discharge: None; has all LB dressing equipment and sufficient bathroom DME       Progress: improving as expected    • Skilled therapy is required to address these limitations in attempt to maximize the patient's independence.     • Predicted patient presentation: Low (stable) - Patient comorbidities and complexities, as defined above, will have little effect on progress for prescribed plan of care.    Education:   - Present and ready to learn: patient  Education provided during session:  -  Results of above outlined education: Verbalizes understanding and Demonstrates understanding    Patient at End of Session:   Location: in chair  Safety measures: lines intact and call light within reach  Handoff to: nurse    PLAN   Suggestions for next session as indicated: discharge acute PT             Agreement to plan and goals: patient agrees with goals and treatment plan         Therapy procedure time and total treatment time can be found documented on the Time Entry flowsheet   57

## 2023-01-18 ENCOUNTER — OFFICE (OUTPATIENT)
Dept: URBAN - METROPOLITAN AREA CLINIC 115 | Facility: CLINIC | Age: 83
Setting detail: OPHTHALMOLOGY
End: 2023-01-18
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
## 2023-01-18 PROCEDURE — NO SHOW FE NO SHOW FEE: Performed by: OPHTHALMOLOGY

## 2023-02-23 PROCEDURE — 0225U NFCT DS DNA&RNA 21 SARSCOV2: CPT

## 2023-02-23 PROCEDURE — 71045 X-RAY EXAM CHEST 1 VIEW: CPT

## 2023-02-23 PROCEDURE — 82962 GLUCOSE BLOOD TEST: CPT

## 2023-02-23 PROCEDURE — 86965 POOLING BLOOD PLATELETS: CPT

## 2023-02-23 PROCEDURE — C1773: CPT

## 2023-02-23 PROCEDURE — G1004: CPT

## 2023-02-23 PROCEDURE — 82803 BLOOD GASES ANY COMBINATION: CPT

## 2023-02-23 PROCEDURE — 80053 COMPREHEN METABOLIC PANEL: CPT

## 2023-02-23 PROCEDURE — 99285 EMERGENCY DEPT VISIT HI MDM: CPT

## 2023-02-23 PROCEDURE — 93005 ELECTROCARDIOGRAM TRACING: CPT

## 2023-02-23 PROCEDURE — 70498 CT ANGIOGRAPHY NECK: CPT | Mod: MG

## 2023-02-23 PROCEDURE — 36415 COLL VENOUS BLD VENIPUNCTURE: CPT

## 2023-02-23 PROCEDURE — 84484 ASSAY OF TROPONIN QUANT: CPT

## 2023-02-23 PROCEDURE — 94002 VENT MGMT INPAT INIT DAY: CPT

## 2023-02-23 PROCEDURE — 70450 CT HEAD/BRAIN W/O DYE: CPT

## 2023-02-23 PROCEDURE — 94760 N-INVAS EAR/PLS OXIMETRY 1: CPT

## 2023-02-23 PROCEDURE — 86901 BLOOD TYPING SEROLOGIC RH(D): CPT

## 2023-02-23 PROCEDURE — U0005: CPT

## 2023-02-23 PROCEDURE — C1757: CPT

## 2023-02-23 PROCEDURE — 0042T: CPT

## 2023-02-23 PROCEDURE — 94003 VENT MGMT INPAT SUBQ DAY: CPT

## 2023-02-23 PROCEDURE — 81001 URINALYSIS AUTO W/SCOPE: CPT

## 2023-02-23 PROCEDURE — 96374 THER/PROPH/DIAG INJ IV PUSH: CPT

## 2023-02-23 PROCEDURE — 85610 PROTHROMBIN TIME: CPT

## 2023-02-23 PROCEDURE — 86900 BLOOD TYPING SEROLOGIC ABO: CPT

## 2023-02-23 PROCEDURE — 36430 TRANSFUSION BLD/BLD COMPNT: CPT

## 2023-02-23 PROCEDURE — 61645 PERQ ART M-THROMBECT &/NFS: CPT

## 2023-02-23 PROCEDURE — 82550 ASSAY OF CK (CPK): CPT

## 2023-02-23 PROCEDURE — C1760: CPT

## 2023-02-23 PROCEDURE — 83735 ASSAY OF MAGNESIUM: CPT

## 2023-02-23 PROCEDURE — 37195 THROMBOLYTIC THERAPY STROKE: CPT

## 2023-02-23 PROCEDURE — 85730 THROMBOPLASTIN TIME PARTIAL: CPT

## 2023-02-23 PROCEDURE — 96375 TX/PRO/DX INJ NEW DRUG ADDON: CPT

## 2023-02-23 PROCEDURE — P9012: CPT

## 2023-02-23 PROCEDURE — 86850 RBC ANTIBODY SCREEN: CPT

## 2023-02-23 PROCEDURE — C1887: CPT

## 2023-02-23 PROCEDURE — 85025 COMPLETE CBC W/AUTO DIFF WBC: CPT

## 2023-02-23 PROCEDURE — 76380 CAT SCAN FOLLOW-UP STUDY: CPT

## 2023-02-23 PROCEDURE — C1894: CPT

## 2023-02-23 PROCEDURE — U0003: CPT

## 2023-02-23 PROCEDURE — C1769: CPT

## 2023-02-23 PROCEDURE — 70496 CT ANGIOGRAPHY HEAD: CPT | Mod: MG

## 2023-02-23 PROCEDURE — 84100 ASSAY OF PHOSPHORUS: CPT

## 2024-02-15 NOTE — ED PROVIDER NOTE - IV ALTEPLASE EXCL REL HIDDEN
show DAIANA Hopper chaperone:   No crepitus, firmness, induration, fluctuance. Skin is normal temp. No erythema/warmth. No obvious skin breaks.   no ulcers/sores. No testicular swelling/ttp, no inguinal hernias. absent cremasteric b/l.     Physical Exam:    CONSTITUTIONAL:  Generally well appearing, no acute distress, alert, awake and oriented  HEENT:  Moist mucous membranes, normal voice  PULM:  No accessory muscle use, speaking full sentences  SKIN:  Normal in appearance, normal color

## 2024-05-16 NOTE — ED ADULT NURSE REASSESSMENT NOTE - NS ED NURSE REASSESS COMMENT FT1
PT A&Ox4.  PT resting comfortably in bed.  Noted to have some bleeding from nose.  IV patent and intact, no s/s of phlebitis.  Will continue to monitor, abdominal pain

## 2024-08-14 NOTE — ED PROVIDER NOTE - CROS ED CONS ALL NEG
----- Message from Yasmin Burdick MD sent at 8/13/2024  3:10 PM CDT -----  please notify the patient of the following results.  Preop labs normal  
[employer] message sent relaying PCP's message below. Nothing further needed.   
negative...

## 2024-11-07 NOTE — PATIENT PROFILE ADULT - PATIENT'S PREFERRED PRONOUN
Bed: ED06  Expected date:   Expected time:   Means of arrival:   Comments:  FLOORS NEED TO BE CLEANED, hold for HW M   
Nurse to nurse handoff report given to ADITI Keyes  
Patient's BP was 87/70 while sitting edge of bed, patient denies feeling dizzy, provider notified. Recheck of the BP 95/56, RR 14. Patient is A/Ox4.   
Provider aware of patient's blood sugar and placed order for 5units insulin.   
Withheld/decline to answer

## 2025-01-20 NOTE — ED PROVIDER NOTE - NS_EDPROVIDERDISPOUSERTYPE_ED_A_ED
Addended by: JEFF BELL on: 1/20/2025 04:25 PM     Modules accepted: Orders    
Attending Attestation (For Attendings USE Only)...